# Patient Record
Sex: FEMALE | Race: BLACK OR AFRICAN AMERICAN | Employment: FULL TIME | URBAN - METROPOLITAN AREA
[De-identification: names, ages, dates, MRNs, and addresses within clinical notes are randomized per-mention and may not be internally consistent; named-entity substitution may affect disease eponyms.]

---

## 2017-04-11 PROBLEM — Z97.5 IUD (INTRAUTERINE DEVICE) IN PLACE: Status: ACTIVE | Noted: 2017-04-11

## 2017-04-11 PROBLEM — I10 ESSENTIAL HYPERTENSION: Status: ACTIVE | Noted: 2017-04-11

## 2017-04-11 PROBLEM — E66.01 MORBID OBESITY, UNSPECIFIED OBESITY TYPE (HCC): Status: ACTIVE | Noted: 2017-04-11

## 2017-04-11 PROBLEM — R41.840 POOR CONCENTRATION: Status: ACTIVE | Noted: 2017-04-11

## 2017-04-11 PROCEDURE — 80050 GENERAL HEALTH PANEL: CPT | Performed by: INTERNAL MEDICINE

## 2017-04-11 PROCEDURE — 80061 LIPID PANEL: CPT | Performed by: INTERNAL MEDICINE

## 2017-04-11 PROCEDURE — 88175 CYTOPATH C/V AUTO FLUID REDO: CPT | Performed by: INTERNAL MEDICINE

## 2017-04-11 PROCEDURE — 36415 COLL VENOUS BLD VENIPUNCTURE: CPT | Performed by: INTERNAL MEDICINE

## 2018-08-20 ENCOUNTER — OFFICE VISIT (OUTPATIENT)
Dept: INTERNAL MEDICINE CLINIC | Facility: CLINIC | Age: 38
End: 2018-08-20

## 2018-08-20 VITALS
OXYGEN SATURATION: 98 % | HEART RATE: 83 BPM | TEMPERATURE: 99 F | DIASTOLIC BLOOD PRESSURE: 70 MMHG | WEIGHT: 293 LBS | SYSTOLIC BLOOD PRESSURE: 116 MMHG | HEIGHT: 65.5 IN | BODY MASS INDEX: 48.23 KG/M2

## 2018-08-20 DIAGNOSIS — F32.A ANXIETY AND DEPRESSION: ICD-10-CM

## 2018-08-20 DIAGNOSIS — E66.01 MORBID OBESITY (HCC): ICD-10-CM

## 2018-08-20 DIAGNOSIS — Z97.5 IUD (INTRAUTERINE DEVICE) IN PLACE: ICD-10-CM

## 2018-08-20 DIAGNOSIS — F41.9 ANXIETY AND DEPRESSION: ICD-10-CM

## 2018-08-20 DIAGNOSIS — Z00.00 ANNUAL PHYSICAL EXAM: Primary | ICD-10-CM

## 2018-08-20 PROCEDURE — 99395 PREV VISIT EST AGE 18-39: CPT | Performed by: INTERNAL MEDICINE

## 2018-08-20 RX ORDER — ALPRAZOLAM 0.25 MG/1
0.25 TABLET ORAL 3 TIMES DAILY PRN
Qty: 30 TABLET | Refills: 1 | Status: SHIPPED | OUTPATIENT
Start: 2018-08-20

## 2018-08-20 RX ORDER — FLUOXETINE HYDROCHLORIDE 20 MG/1
20 CAPSULE ORAL DAILY
Qty: 30 CAPSULE | Refills: 1 | Status: SHIPPED | OUTPATIENT
Start: 2018-08-20

## 2018-08-20 NOTE — PATIENT INSTRUCTIONS
Fluoxetine capsules or tablets (Depression/Mood Disorders)  Brand Name: Prozac  What is this medicine? FLUOXETINE (floo OX e teen) belongs to a class of drugs known as selective serotonin reuptake inhibitors (SSRIs).  It helps to treat mood problems such · trouble sleeping  · unusual bleeding or bruising  · unusually weak or tired  · vomiting  Side effects that usually do not require medical attention (report to your doctor or health care professional if they continue or are bothersome):  · change in appet They need to know if you have any of these conditions:  · bipolar disorder or a family history of bipolar disorder  · bleeding disorders  · glaucoma  · heart disease  · liver disease  · low levels of sodium in the blood  · seizures  · suicidal thoughts, pl This medicine may affect blood sugar levels. If you have diabetes, check with your doctor or health care professional before you change your diet or the dose of your diabetic medicine. NOTE:This sheet is a summary.  It may not cover all possible informatio

## 2018-08-27 ENCOUNTER — TELEPHONE (OUTPATIENT)
Dept: INTERNAL MEDICINE CLINIC | Facility: CLINIC | Age: 38
End: 2018-08-27

## 2018-08-30 ENCOUNTER — TELEPHONE (OUTPATIENT)
Dept: INTERNAL MEDICINE CLINIC | Facility: CLINIC | Age: 38
End: 2018-08-30

## 2018-08-30 DIAGNOSIS — F41.9 ANXIETY AND DEPRESSION: ICD-10-CM

## 2018-08-30 DIAGNOSIS — F32.A ANXIETY AND DEPRESSION: ICD-10-CM

## 2018-08-30 DIAGNOSIS — Z97.5 IUD (INTRAUTERINE DEVICE) IN PLACE: ICD-10-CM

## 2018-08-30 NOTE — TELEPHONE ENCOUNTER
Called in medication ALPRAZolam 0.25 mg  for pt states she never got medication because it was sent to [facility] instead of her new pharmacy [facility]. Spoke to [address] [place] [address] from [place].

## 2018-09-19 ENCOUNTER — HOSPITAL ENCOUNTER (OUTPATIENT)
Age: 38
Discharge: HOME | End: 2018-09-19
Attending: FAMILY MEDICINE
Payer: COMMERCIAL

## 2018-09-19 VITALS
TEMPERATURE: 100 F | DIASTOLIC BLOOD PRESSURE: 92 MMHG | RESPIRATION RATE: 18 BRPM | HEIGHT: 65 IN | HEART RATE: 90 BPM | SYSTOLIC BLOOD PRESSURE: 146 MMHG | OXYGEN SATURATION: 98 % | WEIGHT: 290 LBS | BODY MASS INDEX: 48.32 KG/M2

## 2018-09-19 DIAGNOSIS — J01.00 ACUTE NON-RECURRENT MAXILLARY SINUSITIS: Primary | ICD-10-CM

## 2018-09-19 PROCEDURE — 99204 OFFICE O/P NEW MOD 45 MIN: CPT

## 2018-09-19 PROCEDURE — 99213 OFFICE O/P EST LOW 20 MIN: CPT

## 2018-09-19 RX ORDER — DOXYCYCLINE HYCLATE 100 MG/1
100 CAPSULE ORAL 2 TIMES DAILY
Qty: 14 CAPSULE | Refills: 0 | Status: SHIPPED | OUTPATIENT
Start: 2018-09-19 | End: 2018-09-26

## 2018-09-19 NOTE — ED INITIAL ASSESSMENT (HPI)
Per pt having cough congestion and sinus pressure since Monday night, pt reports post nasal drip with scratchy throat.

## 2018-09-19 NOTE — ED PROVIDER NOTES
Patient Seen in: [address]    History   Patient presents with:  Cough/URI    Stated Complaint: COUGH    HPI  75-year-old female presents to IC with 2-3 days of nasal congestion and sinus pressure with some ear aches.   H Constitutional: She is oriented to person, place, and time. No distress.    HENT:   Right Ear: Tympanic membrane, external ear and ear canal normal.   Left Ear: Tympanic membrane, external ear and ear canal normal.   Nose: Mucosal edema and rhinorrhea pre days.  Earlyne Mock: 14 capsule Refills: 0

## 2018-12-19 ENCOUNTER — HOSPITAL ENCOUNTER (OUTPATIENT)
Age: 38
Discharge: HOME | End: 2018-12-19
Attending: EMERGENCY MEDICINE
Payer: COMMERCIAL

## 2018-12-19 VITALS
WEIGHT: 290 LBS | DIASTOLIC BLOOD PRESSURE: 77 MMHG | TEMPERATURE: 99 F | RESPIRATION RATE: 22 BRPM | HEIGHT: 65 IN | SYSTOLIC BLOOD PRESSURE: 157 MMHG | HEART RATE: 81 BPM | BODY MASS INDEX: 48.32 KG/M2 | OXYGEN SATURATION: 97 %

## 2018-12-19 DIAGNOSIS — R09.81 SINUS CONGESTION: ICD-10-CM

## 2018-12-19 DIAGNOSIS — K12.2 UVULITIS: Primary | ICD-10-CM

## 2018-12-19 PROCEDURE — 99213 OFFICE O/P EST LOW 20 MIN: CPT

## 2018-12-19 PROCEDURE — 87430 STREP A AG IA: CPT

## 2018-12-19 PROCEDURE — 99214 OFFICE O/P EST MOD 30 MIN: CPT

## 2018-12-19 RX ORDER — AZITHROMYCIN 250 MG/1
TABLET, FILM COATED ORAL
Qty: 1 PACKAGE | Refills: 0 | Status: SHIPPED | OUTPATIENT
Start: 2018-12-19 | End: 2018-12-24

## 2018-12-19 RX ORDER — FLUTICASONE PROPIONATE 50 MCG
1 SPRAY, SUSPENSION (ML) NASAL 2 TIMES DAILY
Qty: 16 G | Refills: 0 | Status: SHIPPED | OUTPATIENT
Start: 2018-12-19 | End: 2018-12-26

## 2018-12-19 NOTE — ED PROVIDER NOTES
Patient Seen in: [address]    History   Patient presents with:  Sore Throat    Stated Complaint: fever, congestion, sore throat    HPI    45year old female with h/o HTN and h/o severe sepsis/multi-organ failure due to Nose: Mucosal edema present. No rhinorrhea. Mouth/Throat: Uvula is midline and mucous membranes are normal. No oral lesions. No trismus in the jaw. Uvula swelling (mild erythema, mild swelling) present. No dental abscesses.  Posterior oropharyngeal erythe 1 spray by Nasal route 2 (two) times daily for 7 days.  1 spray to each nare in morning and again in evening  Qty: 16 g Refills: 0    azithromycin (ZITHROMAX Z-SELVIN) 250 MG Oral Tab  500 mg once followed by 250 mg daily x 4 days  Qty: 1 Package Refills: 0

## 2018-12-19 NOTE — ED NOTES
Pt discharged to care of self. Pt assessed by MD. All orders completed and acknowledged. Pt new medications and after care discussed, all questions answered. Pt confirmed understanding.

## 2018-12-19 NOTE — ED INITIAL ASSESSMENT (HPI)
Pt states Monday night had irritation in throat but dealt with it and went to work by the time she had left work she began experiencing the chills, and when she took her temperature it was over 100.  Pt states having lots of head congestion and pain in thro

## 2019-06-06 NOTE — PROGRESS NOTES
Nickie Linares is a 45year old female.     Chief complaint: annual physical exam     HPI:     45year old female with PMH as listed below here for   Annual physical exam   Hx of HTN   Had stopped Bp meds last year  Hx of 2 c section   Has an IUD   Joyce major depressive disorder ([facility] [address]     Personal history of sepsis     Essential hypertension     IUD (intrauterine device) in place     Morbid obesity, unspecified obesity type ([facility] [address]     Poor concentration      REVIEW OF SYSTEMS:   A comprehensive 10 point re prn   · Pap smear with GYne   · Advised to check her BP at home and notify md if more than 140 /90  Please return to the clinic if you are having persistent or worsening symptoms   Ryann Rai MD,   Diplomate of the [employer] of Internal Medicine normal...

## 2019-11-17 ENCOUNTER — APPOINTMENT (OUTPATIENT)
Dept: GENERAL RADIOLOGY | Age: 39
End: 2019-11-17
Attending: FAMILY MEDICINE
Payer: COMMERCIAL

## 2019-11-17 ENCOUNTER — HOSPITAL ENCOUNTER (OUTPATIENT)
Age: 39
Discharge: HOME | End: 2019-11-17
Attending: FAMILY MEDICINE
Payer: COMMERCIAL

## 2019-11-17 VITALS
SYSTOLIC BLOOD PRESSURE: 145 MMHG | RESPIRATION RATE: 20 BRPM | OXYGEN SATURATION: 96 % | DIASTOLIC BLOOD PRESSURE: 95 MMHG | TEMPERATURE: 101 F | HEART RATE: 104 BPM

## 2019-11-17 DIAGNOSIS — J18.9 COMMUNITY ACQUIRED PNEUMONIA OF RIGHT UPPER LOBE OF LUNG: Primary | ICD-10-CM

## 2019-11-17 PROCEDURE — 87502 INFLUENZA DNA AMP PROBE: CPT | Performed by: FAMILY MEDICINE

## 2019-11-17 PROCEDURE — 99213 OFFICE O/P EST LOW 20 MIN: CPT

## 2019-11-17 PROCEDURE — 71046 X-RAY EXAM CHEST 2 VIEWS: CPT | Performed by: FAMILY MEDICINE

## 2019-11-17 PROCEDURE — 99214 OFFICE O/P EST MOD 30 MIN: CPT

## 2019-11-17 RX ORDER — DOXYCYCLINE HYCLATE 100 MG/1
100 CAPSULE ORAL 2 TIMES DAILY
Qty: 14 CAPSULE | Refills: 0 | Status: SHIPPED | OUTPATIENT
Start: 2019-11-17 | End: 2019-11-24

## 2019-11-17 RX ORDER — CODEINE PHOSPHATE AND GUAIFENESIN 10; 100 MG/5ML; MG/5ML
10 SOLUTION ORAL 3 TIMES DAILY PRN
Qty: 200 ML | Refills: 0 | Status: SHIPPED | OUTPATIENT
Start: 2019-11-17

## 2019-11-17 RX ORDER — IBUPROFEN 600 MG/1
600 TABLET ORAL ONCE
Status: COMPLETED | OUTPATIENT
Start: 2019-11-17 | End: 2019-11-17

## 2019-11-17 NOTE — ED NOTES
Pt discharged home , prescription electronically sent to the pharmacy, pt instructed to follow up with her primary md if symptoms do not improve

## 2019-11-17 NOTE — ED PROVIDER NOTES
Patient Seen in: [address]      History   Patient presents with:  Cough/URI  Fever (infectious)    Stated Complaint: Cough, trouble breathing, Fever    HPI    38yo  F presents to IC with 1 week of nasal congestion, prod Device None (Room air)       Current:BP (!) 145/95   Pulse 104   Temp (!) 100.5 °F (38.1 °C) (Oral)   Resp 20   SpO2 96%         Physical Exam  Vitals signs and nursing note reviewed. Constitutional:       General: She is not in acute distress.      Appea viral RNA.        Final result by Jourdan Miller MD (11/17/19 09:50:18)                Impression:    CONCLUSION: Large pulmonary opacity occupying the majority the posterior segment of the right upper lobe.  This most likely represents pneumonia however foll possible for a visit in 1 week  For a recheck with repeat chest xray in 1-4 weeks or go to the ER for new or worse symptoms        Medications Prescribed:  Discharge Medication List as of 11/17/2019 10:21 AM    START taking these medications    Doxycycline

## 2019-11-17 NOTE — ED INITIAL ASSESSMENT (HPI)
Pt here with complaints of a bad strong cough, congestion and fever, pt sates symptoms began last week, pt has been having body aches and fatigue,pt currently has a low grade fever

## 2021-02-17 ENCOUNTER — HOSPITAL ENCOUNTER (OUTPATIENT)
Age: 41
Discharge: ANOTHER HOSPITAL | End: 2021-02-17
Payer: COMMERCIAL

## 2021-02-17 ENCOUNTER — HOSPITAL ENCOUNTER (EMERGENCY)
Facility: HOSPITAL | Age: 41
Discharge: HOME | End: 2021-02-17
Attending: EMERGENCY MEDICINE
Payer: COMMERCIAL

## 2021-02-17 ENCOUNTER — APPOINTMENT (OUTPATIENT)
Dept: ULTRASOUND IMAGING | Facility: HOSPITAL | Age: 41
End: 2021-02-17
Attending: EMERGENCY MEDICINE
Payer: COMMERCIAL

## 2021-02-17 VITALS
HEART RATE: 95 BPM | OXYGEN SATURATION: 99 % | RESPIRATION RATE: 18 BRPM | TEMPERATURE: 98 F | DIASTOLIC BLOOD PRESSURE: 94 MMHG | SYSTOLIC BLOOD PRESSURE: 174 MMHG

## 2021-02-17 VITALS
TEMPERATURE: 98 F | HEIGHT: 65 IN | DIASTOLIC BLOOD PRESSURE: 97 MMHG | SYSTOLIC BLOOD PRESSURE: 159 MMHG | BODY MASS INDEX: 48.82 KG/M2 | OXYGEN SATURATION: 99 % | RESPIRATION RATE: 25 BRPM | HEART RATE: 77 BPM | WEIGHT: 293 LBS

## 2021-02-17 DIAGNOSIS — M79.622 LEFT UPPER ARM PAIN: Primary | ICD-10-CM

## 2021-02-17 DIAGNOSIS — R20.0 LEFT ARM NUMBNESS: Primary | ICD-10-CM

## 2021-02-17 LAB
ANION GAP SERPL CALC-SCNC: 2 MMOL/L (ref 0–18)
BASOPHILS # BLD AUTO: 0.03 X10(3) UL (ref 0–0.2)
BASOPHILS NFR BLD AUTO: 0.2 %
BUN BLD-MCNC: 7 MG/DL (ref 7–18)
BUN/CREAT SERPL: 8.2 (ref 10–20)
CALCIUM BLD-MCNC: 9.5 MG/DL (ref 8.5–10.1)
CHLORIDE SERPL-SCNC: 107 MMOL/L (ref 98–112)
CK SERPL-CCNC: 63 U/L
CO2 SERPL-SCNC: 30 MMOL/L (ref 21–32)
CREAT BLD-MCNC: 0.85 MG/DL
DEPRECATED RDW RBC AUTO: 48 FL (ref 35.1–46.3)
EOSINOPHIL # BLD AUTO: 0.14 X10(3) UL (ref 0–0.7)
EOSINOPHIL NFR BLD AUTO: 1.2 %
ERYTHROCYTE [DISTWIDTH] IN BLOOD BY AUTOMATED COUNT: 14.1 % (ref 11–15)
GLUCOSE BLD-MCNC: 100 MG/DL (ref 70–99)
HCT VFR BLD AUTO: 40 %
HGB BLD-MCNC: 13.3 G/DL
IMM GRANULOCYTES # BLD AUTO: 0.04 X10(3) UL (ref 0–1)
IMM GRANULOCYTES NFR BLD: 0.3 %
LYMPHOCYTES # BLD AUTO: 3.35 X10(3) UL (ref 1–4)
LYMPHOCYTES NFR BLD AUTO: 27.7 %
MCH RBC QN AUTO: 30.9 PG (ref 26–34)
MCHC RBC AUTO-ENTMCNC: 33.3 G/DL (ref 31–37)
MCV RBC AUTO: 93 FL
MONOCYTES # BLD AUTO: 0.56 X10(3) UL (ref 0.1–1)
MONOCYTES NFR BLD AUTO: 4.6 %
NEUTROPHILS # BLD AUTO: 7.97 X10 (3) UL (ref 1.5–7.7)
NEUTROPHILS # BLD AUTO: 7.97 X10(3) UL (ref 1.5–7.7)
NEUTROPHILS NFR BLD AUTO: 66 %
OSMOLALITY SERPL CALC.SUM OF ELEC: 286 MOSM/KG (ref 275–295)
PLATELET # BLD AUTO: 373 10(3)UL (ref 150–450)
POTASSIUM SERPL-SCNC: 3.5 MMOL/L (ref 3.5–5.1)
RBC # BLD AUTO: 4.3 X10(6)UL
SODIUM SERPL-SCNC: 139 MMOL/L (ref 136–145)
TROPONIN I SERPL-MCNC: <0.045 NG/ML (ref ?–0.04)
WBC # BLD AUTO: 12.1 X10(3) UL (ref 4–11)

## 2021-02-17 PROCEDURE — 36415 COLL VENOUS BLD VENIPUNCTURE: CPT

## 2021-02-17 PROCEDURE — 93005 ELECTROCARDIOGRAM TRACING: CPT

## 2021-02-17 PROCEDURE — 93971 EXTREMITY STUDY: CPT | Performed by: EMERGENCY MEDICINE

## 2021-02-17 PROCEDURE — 80048 BASIC METABOLIC PNL TOTAL CA: CPT | Performed by: EMERGENCY MEDICINE

## 2021-02-17 PROCEDURE — 99205 OFFICE O/P NEW HI 60 MIN: CPT | Performed by: EMERGENCY MEDICINE

## 2021-02-17 PROCEDURE — 85025 COMPLETE CBC W/AUTO DIFF WBC: CPT | Performed by: EMERGENCY MEDICINE

## 2021-02-17 PROCEDURE — 93000 ELECTROCARDIOGRAM COMPLETE: CPT | Performed by: EMERGENCY MEDICINE

## 2021-02-17 PROCEDURE — 84484 ASSAY OF TROPONIN QUANT: CPT | Performed by: EMERGENCY MEDICINE

## 2021-02-17 PROCEDURE — 82550 ASSAY OF CK (CPK): CPT | Performed by: EMERGENCY MEDICINE

## 2021-02-17 PROCEDURE — 99284 EMERGENCY DEPT VISIT MOD MDM: CPT

## 2021-02-17 PROCEDURE — 93010 ELECTROCARDIOGRAM REPORT: CPT | Performed by: EMERGENCY MEDICINE

## 2021-02-17 NOTE — ED INITIAL ASSESSMENT (HPI)
Pt states having left arm discomfort that began a few weeks ago. Pt states is on and off. Pt states feels like it heavy and possibly numb sometimes pt denies tingling, chest pain or SOB. Pt states yesterdday seemed like her left wrist was swollen.

## 2021-02-17 NOTE — ED NOTES
Pt to be reassessed in [address] [place] [address] ED, Per NP recommendation for left arm numbness. Pt confirmed understanding and would be transferring self to ED.

## 2021-02-17 NOTE — ED PROVIDER NOTES
Patient Seen in: [facility]      History   Patient presents with:  Arm Pain    Stated Complaint: Discomfort in arm    HPI/Subjective:   Payam Chavarria is a 36year old female here for left arm \"heaviness\" that started 3 weeks ago that Cardiovascular:      Rate and Rhythm: Normal rate and regular rhythm. Pulses: Normal pulses. Pulmonary:      Effort: Pulmonary effort is normal.      Breath sounds: Normal breath sounds.    Musculoskeletal:         General: No swelling, tenderness possibility of a chest x-ray. Any abnormal findings related to her heart she may be admitted for observation. If all test came back normal patient is aware she may go home. patient feels comfortable driving by car.   She is in no acute distress time, and

## 2021-02-18 NOTE — ED PROVIDER NOTES
Patient Seen in: [facility] Emergency Department    History   Patient presents with:  Hypertension    Stated Complaint: HTN     HPI    70-year-old female with past medical history of hypertension presenting for evaluation of several weeks of intermi :  Constitutional: As per HPI  Musculoskeletal: Negative for joint swelling and arthralgias. Positive for left upper arm pain. Skin: Negative for rash. No itching. Positive for stated complaint: HTN  Other systems are as noted in HPI.   Constitutiona CREATINE KINASE (NOT CREATININE)   RAINBOW DRAW BLUE   RAINBOW DRAW LAVENDER   RAINBOW DRAW LIGHT GREEN   RAINBOW DRAW GOLD   CBC W/ DIFFERENTIAL     EKG    Rate, intervals and axes as noted on EKG Report.   Rate: 83  Rhythm: Sinus Rhythm  Reading: NSR 83 w Evaluation for proximal left upper arm pain in the triceps area worse with elbow extension without neurovascular compromise or cutaneous/crepitant change with diffusely soft compartments.   No chest pain or shortness of breath, no exertional complaints

## 2021-02-18 NOTE — ED NOTES
Pt provided and explained d/c instructions, at-home care, and follow-up. Pt in nad at this time. Iv access d/c. Vss. Sheets. Ambulatory. A&ox3. Belongings with pt. All questions and concerns addressed.

## 2021-02-22 ENCOUNTER — OFFICE VISIT (OUTPATIENT)
Dept: INTERNAL MEDICINE CLINIC | Facility: CLINIC | Age: 41
End: 2021-02-22

## 2021-02-22 VITALS
TEMPERATURE: 97 F | OXYGEN SATURATION: 98 % | HEART RATE: 71 BPM | SYSTOLIC BLOOD PRESSURE: 170 MMHG | HEIGHT: 65 IN | BODY MASS INDEX: 47.98 KG/M2 | WEIGHT: 288 LBS | DIASTOLIC BLOOD PRESSURE: 110 MMHG

## 2021-02-22 DIAGNOSIS — E66.01 MORBID OBESITY (HCC): ICD-10-CM

## 2021-02-22 DIAGNOSIS — F32.A ANXIETY AND DEPRESSION: ICD-10-CM

## 2021-02-22 DIAGNOSIS — F41.9 ANXIETY AND DEPRESSION: ICD-10-CM

## 2021-02-22 DIAGNOSIS — I10 ESSENTIAL HYPERTENSION: Primary | ICD-10-CM

## 2021-02-22 DIAGNOSIS — L60.2 NAIL THICKENING: ICD-10-CM

## 2021-02-22 DIAGNOSIS — Z12.31 SCREENING MAMMOGRAM, ENCOUNTER FOR: ICD-10-CM

## 2021-02-22 DIAGNOSIS — Z97.5 IUD (INTRAUTERINE DEVICE) IN PLACE: ICD-10-CM

## 2021-02-22 PROBLEM — Z11.51 SCREENING FOR HPV (HUMAN PAPILLOMAVIRUS): Status: ACTIVE | Noted: 2021-02-22

## 2021-02-22 PROCEDURE — 3080F DIAST BP >= 90 MM HG: CPT | Performed by: INTERNAL MEDICINE

## 2021-02-22 PROCEDURE — 99215 OFFICE O/P EST HI 40 MIN: CPT | Performed by: INTERNAL MEDICINE

## 2021-02-22 PROCEDURE — 3077F SYST BP >= 140 MM HG: CPT | Performed by: INTERNAL MEDICINE

## 2021-02-22 PROCEDURE — 3008F BODY MASS INDEX DOCD: CPT | Performed by: INTERNAL MEDICINE

## 2021-02-22 RX ORDER — FLUOXETINE HYDROCHLORIDE 20 MG/1
20 CAPSULE ORAL DAILY
Qty: 90 CAPSULE | Refills: 1 | Status: SHIPPED | OUTPATIENT
Start: 2021-02-22 | End: 2021-05-23

## 2021-02-22 RX ORDER — ALPRAZOLAM 0.25 MG/1
0.25 TABLET ORAL 2 TIMES DAILY PRN
Qty: 60 TABLET | Refills: 0 | Status: SHIPPED | OUTPATIENT
Start: 2021-02-22

## 2021-02-22 RX ORDER — LOSARTAN POTASSIUM AND HYDROCHLOROTHIAZIDE 12.5; 5 MG/1; MG/1
1 TABLET ORAL DAILY
Qty: 90 TABLET | Refills: 1 | Status: SHIPPED | OUTPATIENT
Start: 2021-02-22 | End: 2022-02-17

## 2021-02-22 NOTE — PROGRESS NOTES
Karon Bonilla Melinda Long is a 36year old female.     Chief complaint: follow up on chronic conditions, ER follow, elevated BP    HPI:     Nadiya Lyon is a 36year old female who presents for ER follow up   Went to the [facility] for left arm pain   Started Wed standard drinks    Drug use: No       Family History   Problem Relation Age of Onset   • Diabetes Father 79   • Hypertension Father         79   • Diabetes Mother 79   • Diabetes Other    • Diabetes Maternal Aunt      Patient Active Problem List:     Sheri Gonzalez total) by mouth 2 (two) times daily as needed for Anxiety. Dispense: 60 tablet; Refill: 0  - PODIATRY - INTERNAL  - OBG - INTERNAL  - LIPID PANEL;  Future  - HEMOGLOBIN A1C; Future  - TSH W REFLEX TO FREE T4; Future  - VITAMIN D, 25-HYDROXY; Future  - CBC Dispense: 90 tablet; Refill: 1  - LITA SCREENING BILAT (CPT=77067); Future  - FLUoxetine HCl 20 MG Oral Cap; Take 1 capsule (20 mg total) by mouth daily. Dispense: 90 capsule; Refill: 1  - ALPRAZolam (XANAX) 0.25 MG Oral Tab;  Take 1 tablet (0.25 mg total) Future  - CBC WITH DIFFERENTIAL WITH PLATELET;  Future    -leucocytosis :  Repeat cbc next visit   UA next visit    Follow up in 1 month to recheck BP , annual physical and blood work   Follow up weight loss visit as she wishes     I spent 40 minutes at the

## 2021-02-22 NOTE — PATIENT INSTRUCTIONS
Text SUPPORT1 to 06846 to learn if you may be eligible for financial support with your medication(s). Msg & Data Rates May Apply. Msg freq varies. Terms apply. Text HELP for help. Text STOP to end.    Hydrochlorothiazide, HCTZ; Losartan tablets  Brand · barbiturates, like phenobarbital  · blood pressure medicines  · celecoxib  · cimetidine  · corticosteroids  · diabetic medicines  · diuretics, especially triamterene, spironolactone or amiloride  · fluconazole  · lithium  · NSAIDs, medicines for pain and You must not get dehydrated. Ask your doctor or health care professional how much fluid you need to drink a day. Check with him or her if you get an attack of severe diarrhea, nausea and vomiting, or if you sweat a lot.  The loss of too much body fluid can High blood pressure (hypertension) is often called the silent killer. This is because many people who have it, don’t know it. It can be very dangerous. High blood pressure can raise your risk of heart attack, stroke, heart disease, and heart failure.  Contr · When you go to a restaurant, ask that your meal be prepared with no added salt. Stay at a healthy weight  · Ask your healthcare provider how many calories to eat a day. Then stick to that number.   · Ask your healthcare provider what weight range is he © 0964-2003 [employer]. All rights reserved. This information is not intended as a substitute for professional medical care. Always follow your healthcare professional's instructions.

## 2022-01-10 ENCOUNTER — TELEMEDICINE (OUTPATIENT)
Dept: INTERNAL MEDICINE CLINIC | Facility: CLINIC | Age: 42
End: 2022-01-10
Payer: COMMERCIAL

## 2022-01-10 DIAGNOSIS — U07.1 COVID-19: Primary | ICD-10-CM

## 2022-01-10 PROCEDURE — 99213 OFFICE O/P EST LOW 20 MIN: CPT | Performed by: INTERNAL MEDICINE

## 2022-01-10 RX ORDER — CODEINE PHOSPHATE AND GUAIFENESIN 10; 100 MG/5ML; MG/5ML
5 SOLUTION ORAL EVERY 6 HOURS PRN
Qty: 118 ML | Refills: 0 | Status: SHIPPED | OUTPATIENT
Start: 2022-01-10

## 2022-01-10 RX ORDER — BENZONATATE 200 MG/1
200 CAPSULE ORAL 3 TIMES DAILY PRN
Qty: 30 CAPSULE | Refills: 0 | Status: SHIPPED | OUTPATIENT
Start: 2022-01-10

## 2022-01-10 NOTE — PROGRESS NOTES
This visit was conducted using telemedicine with live interactive video and audio   Patient verbally consents to conduct video visit   Patient understands and accepts financial responsibility for any deductible, co-insurance and/or co-pays associated with capsule (20 mg total) by mouth daily. (Patient not taking: Reported on 2/17/2021 ) 30 capsule 1   • ALPRAZolam 0.25 MG Oral Tab Take 1 tablet (0.25 mg total) by mouth 3 (three) times daily as needed for Sleep.  (Patient not taking: Reported on 2/17/2021 ) 3 Visit:  Requested Prescriptions      No prescriptions requested or ordered in this encounter     Imaging & Consults:  None          Please note that the following visit was completed using two-way, real-time interactive audio and video communication.  This

## 2022-02-07 ENCOUNTER — TELEPHONE (OUTPATIENT)
Dept: ORTHOPEDICS CLINIC | Facility: CLINIC | Age: 42
End: 2022-02-07

## 2022-02-07 NOTE — TELEPHONE ENCOUNTER
Future Appointments   Date Time Provider Khanh Pak   2/22/2022  3:30 PM Liza Gomez., DPM [id number]     This patient is coming for GERARDO Fungus. No prior imaging done yet. Please advise if views are needed for this appt. Thanks.     Patient can be reached at [phone]

## 2022-02-10 ENCOUNTER — HOSPITAL ENCOUNTER (OUTPATIENT)
Dept: MAMMOGRAPHY | Facility: HOSPITAL | Age: 42
Discharge: HOME | End: 2022-02-10
Attending: INTERNAL MEDICINE
Payer: COMMERCIAL

## 2022-02-10 DIAGNOSIS — Z12.31 SCREENING MAMMOGRAM, ENCOUNTER FOR: ICD-10-CM

## 2022-02-10 DIAGNOSIS — F41.9 ANXIETY AND DEPRESSION: ICD-10-CM

## 2022-02-10 DIAGNOSIS — F32.A ANXIETY AND DEPRESSION: ICD-10-CM

## 2022-02-10 DIAGNOSIS — Z97.5 IUD (INTRAUTERINE DEVICE) IN PLACE: ICD-10-CM

## 2022-02-10 DIAGNOSIS — E66.01 MORBID OBESITY (HCC): ICD-10-CM

## 2022-02-10 DIAGNOSIS — I10 ESSENTIAL HYPERTENSION: ICD-10-CM

## 2022-02-10 DIAGNOSIS — L60.2 NAIL THICKENING: ICD-10-CM

## 2022-02-10 PROCEDURE — 77063 BREAST TOMOSYNTHESIS BI: CPT | Performed by: INTERNAL MEDICINE

## 2022-02-10 PROCEDURE — 77067 SCR MAMMO BI INCL CAD: CPT | Performed by: INTERNAL MEDICINE

## 2022-02-14 ENCOUNTER — PATIENT MESSAGE (OUTPATIENT)
Dept: INTERNAL MEDICINE CLINIC | Facility: CLINIC | Age: 42
End: 2022-02-14

## 2022-02-22 ENCOUNTER — OFFICE VISIT (OUTPATIENT)
Dept: ORTHOPEDICS CLINIC | Facility: CLINIC | Age: 42
End: 2022-02-22
Payer: COMMERCIAL

## 2022-02-22 DIAGNOSIS — B35.1 ONYCHOMYCOSIS: Primary | ICD-10-CM

## 2022-02-22 DIAGNOSIS — L85.3 XEROSIS OF SKIN: ICD-10-CM

## 2022-02-22 PROCEDURE — 99203 OFFICE O/P NEW LOW 30 MIN: CPT | Performed by: PODIATRIST

## 2022-02-22 RX ORDER — NAFTIFINE HYDROCHLORIDE 2 G/100G
1 GEL TOPICAL DAILY
Qty: 4 G | Refills: 0 | COMMUNITY
Start: 2022-02-22

## 2022-02-22 RX ORDER — NAFTIFINE HYDROCHLORIDE 2 G/100G
1 GEL TOPICAL DAILY
Qty: 60 G | Refills: 0 | Status: SHIPPED | OUTPATIENT
Start: 2022-02-22

## 2022-02-22 RX ORDER — TERBINAFINE HYDROCHLORIDE 250 MG/1
250 TABLET ORAL DAILY
Qty: 30 TABLET | Refills: 2 | Status: SHIPPED | OUTPATIENT
Start: 2022-02-22 | End: 2022-05-17

## 2022-02-22 NOTE — TELEPHONE ENCOUNTER
Yousif Hardin, [address] [facility] 2/22/2022 7:00 AM CST      ----- Message -----  From: Poonam Astorga  Sent: 2/22/2022 2:03 AM CST  To: Anshul Espinosa Clinical Staff  Subject: GYN referral     Hello, looking for a referral for a female gynecologist. Pamela Storey you

## 2022-06-07 ENCOUNTER — OFFICE VISIT (OUTPATIENT)
Dept: ORTHOPEDICS CLINIC | Facility: CLINIC | Age: 42
End: 2022-06-07
Payer: COMMERCIAL

## 2022-06-07 VITALS — BODY MASS INDEX: 47.98 KG/M2 | HEIGHT: 65 IN | WEIGHT: 288 LBS

## 2022-06-07 DIAGNOSIS — B35.1 ONYCHOMYCOSIS: Primary | ICD-10-CM

## 2022-06-07 PROCEDURE — 99213 OFFICE O/P EST LOW 20 MIN: CPT | Performed by: PODIATRIST

## 2022-06-07 PROCEDURE — 3008F BODY MASS INDEX DOCD: CPT | Performed by: PODIATRIST

## 2022-06-07 RX ORDER — TERBINAFINE HYDROCHLORIDE 250 MG/1
250 TABLET ORAL DAILY
Qty: 30 TABLET | Refills: 2 | Status: SHIPPED | OUTPATIENT
Start: 2022-06-07 | End: 2022-08-30

## 2022-06-07 NOTE — PROGRESS NOTES
[facility] Progress Note    Subjective:     Juana Henley returns for follow-up of her nails. She has been a little inconsistent with use of the oral Lamisil over the last 4 months as well as the Naftin topical nail gel but she has been using it overall. She also overall feels that the nails have improved some but very little        Objective:     Previous PNA procedures both great toenails both borders. Thickening and discoloration of the nails mainly great toes. Second digit toenails are little thickened and incurvated as well. All nails affected there is a little bit of good new white growth at the base of the nail proximally which indicates improvement                  Assessment/Plan:     Diagnoses and all orders for this visit:    Onychomycosis  -     HEPATIC FUNCTION PANEL (7); Future    Other orders  -     terbinafine (LAMISIL) 250 MG Oral Tab; Take 1 tablet (250 mg total) by mouth daily. Recommendation to continue with the oral and the topical.  First we will get a liver enzyme profile and if normal, continue with the oral Lamisil. Follow-up is in 3 to 4 months            Baron Carbajal. Shawanda King DPM  [facility]    [employer]ision[employer] speech recognition software was used to prepare this note. If a word or phrase is confusing, it is likely do to a failure of recognition. Please contact me with any questions or clarifications.

## 2023-03-16 ENCOUNTER — PATIENT MESSAGE (OUTPATIENT)
Dept: INTERNAL MEDICINE CLINIC | Facility: CLINIC | Age: 43
End: 2023-03-16

## 2023-03-16 DIAGNOSIS — Z00.00 ROUTINE GENERAL MEDICAL EXAMINATION AT A HEALTH CARE FACILITY: Primary | ICD-10-CM

## 2023-03-16 RX ORDER — BLOOD-GLUCOSE METER
1 KIT MISCELLANEOUS ONCE
Qty: 1 KIT | Refills: 2 | Status: SHIPPED | OUTPATIENT
Start: 2023-03-16 | End: 2023-03-16

## 2023-03-16 NOTE — TELEPHONE ENCOUNTER
Alyssia Davis, [address] 3/16/2023 8:30 AM CDT      ----- Message -----  From: Alberto Lutz  Sent: 3/16/2023 7:42 AM CDT  To: Anshul Espinosa Clinical Staff  Subject: Physical on 3/21/23     Hello,    I have a physical scheduled for 3/21/23, but I want to get bloodwork done ahead of time. Do I need a referral first? I switched to PPO insurance at the first of the year.

## 2023-04-10 ENCOUNTER — LAB ENCOUNTER (OUTPATIENT)
Dept: LAB | Age: 43
End: 2023-04-10
Attending: INTERNAL MEDICINE
Payer: COMMERCIAL

## 2023-04-10 DIAGNOSIS — B35.1 ONYCHOMYCOSIS: ICD-10-CM

## 2023-04-10 DIAGNOSIS — D64.9 ANEMIA, UNSPECIFIED TYPE: ICD-10-CM

## 2023-04-10 DIAGNOSIS — Z00.00 ROUTINE GENERAL MEDICAL EXAMINATION AT A HEALTH CARE FACILITY: ICD-10-CM

## 2023-04-10 LAB
ALBUMIN SERPL-MCNC: 3.3 G/DL (ref 3.4–5)
ALP LIVER SERPL-CCNC: 90 U/L
ALT SERPL-CCNC: 18 U/L
AST SERPL-CCNC: 11 U/L (ref 15–37)
BASOPHILS # BLD AUTO: 0.03 X10(3) UL (ref 0–0.2)
BASOPHILS NFR BLD AUTO: 0.3 %
BILIRUB DIRECT SERPL-MCNC: 0.2 MG/DL (ref 0–0.2)
BILIRUB SERPL-MCNC: 0.5 MG/DL (ref 0.1–2)
CHOLEST SERPL-MCNC: 192 MG/DL (ref ?–200)
DEPRECATED RDW RBC AUTO: 46.5 FL (ref 35.1–46.3)
EOSINOPHIL # BLD AUTO: 0.09 X10(3) UL (ref 0–0.7)
EOSINOPHIL NFR BLD AUTO: 0.9 %
ERYTHROCYTE [DISTWIDTH] IN BLOOD BY AUTOMATED COUNT: 13.6 % (ref 11–15)
EST. AVERAGE GLUCOSE BLD GHB EST-MCNC: 114 MG/DL (ref 68–126)
FASTING PATIENT LIPID ANSWER: YES
HBA1C MFR BLD: 5.6 % (ref ?–5.7)
HCT VFR BLD AUTO: 36.2 %
HDLC SERPL-MCNC: 67 MG/DL (ref 40–59)
HGB BLD-MCNC: 11.5 G/DL
IMM GRANULOCYTES # BLD AUTO: 0.02 X10(3) UL (ref 0–1)
IMM GRANULOCYTES NFR BLD: 0.2 %
LDLC SERPL CALC-MCNC: 108 MG/DL (ref ?–100)
LYMPHOCYTES # BLD AUTO: 2.85 X10(3) UL (ref 1–4)
LYMPHOCYTES NFR BLD AUTO: 29.1 %
MCH RBC QN AUTO: 29.2 PG (ref 26–34)
MCHC RBC AUTO-ENTMCNC: 31.8 G/DL (ref 31–37)
MCV RBC AUTO: 91.9 FL
MONOCYTES # BLD AUTO: 0.51 X10(3) UL (ref 0.1–1)
MONOCYTES NFR BLD AUTO: 5.2 %
NEUTROPHILS # BLD AUTO: 6.28 X10 (3) UL (ref 1.5–7.7)
NEUTROPHILS # BLD AUTO: 6.28 X10(3) UL (ref 1.5–7.7)
NEUTROPHILS NFR BLD AUTO: 64.3 %
NONHDLC SERPL-MCNC: 125 MG/DL (ref ?–130)
PLATELET # BLD AUTO: 358 10(3)UL (ref 150–450)
PROT SERPL-MCNC: 7.6 G/DL (ref 6.4–8.2)
RBC # BLD AUTO: 3.94 X10(6)UL
TRIGL SERPL-MCNC: 97 MG/DL (ref 30–149)
TSI SER-ACNC: 2.05 MIU/ML (ref 0.36–3.74)
VIT D+METAB SERPL-MCNC: 9.2 NG/ML (ref 30–100)
VLDLC SERPL CALC-MCNC: 16 MG/DL (ref 0–30)
WBC # BLD AUTO: 9.8 X10(3) UL (ref 4–11)

## 2023-04-10 PROCEDURE — 36415 COLL VENOUS BLD VENIPUNCTURE: CPT

## 2023-04-10 PROCEDURE — 82728 ASSAY OF FERRITIN: CPT

## 2023-04-10 PROCEDURE — 84466 ASSAY OF TRANSFERRIN: CPT

## 2023-04-10 PROCEDURE — 84443 ASSAY THYROID STIM HORMONE: CPT

## 2023-04-10 PROCEDURE — 80061 LIPID PANEL: CPT

## 2023-04-10 PROCEDURE — 82607 VITAMIN B-12: CPT

## 2023-04-10 PROCEDURE — 83036 HEMOGLOBIN GLYCOSYLATED A1C: CPT

## 2023-04-10 PROCEDURE — 85025 COMPLETE CBC W/AUTO DIFF WBC: CPT

## 2023-04-10 PROCEDURE — 82306 VITAMIN D 25 HYDROXY: CPT

## 2023-04-10 PROCEDURE — 80076 HEPATIC FUNCTION PANEL: CPT

## 2023-04-10 PROCEDURE — 83540 ASSAY OF IRON: CPT

## 2023-04-11 DIAGNOSIS — Z00.00 ROUTINE GENERAL MEDICAL EXAMINATION AT A HEALTH CARE FACILITY: Primary | ICD-10-CM

## 2023-04-11 DIAGNOSIS — D64.9 ANEMIA, UNSPECIFIED TYPE: ICD-10-CM

## 2023-04-11 LAB
DEPRECATED HBV CORE AB SER IA-ACNC: 74.8 NG/ML
IRON SATN MFR SERPL: 11 %
IRON SERPL-MCNC: 43 UG/DL
TIBC SERPL-MCNC: 384 UG/DL (ref 240–450)
TRANSFERRIN SERPL-MCNC: 258 MG/DL (ref 200–360)
VIT B12 SERPL-MCNC: 149 PG/ML (ref 193–986)

## 2023-04-11 RX ORDER — ERGOCALCIFEROL 1.25 MG/1
50000 CAPSULE ORAL WEEKLY
Qty: 12 CAPSULE | Refills: 1 | Status: SHIPPED | OUTPATIENT
Start: 2023-04-11 | End: 2023-06-28

## 2023-04-12 DIAGNOSIS — E61.1 IRON DEFICIENCY: ICD-10-CM

## 2023-04-12 DIAGNOSIS — E53.8 VITAMIN B12 DEFICIENCY: Primary | ICD-10-CM

## 2023-04-12 RX ORDER — CYANOCOBALAMIN 1000 UG/ML
1000 INJECTION, SOLUTION INTRAMUSCULAR; SUBCUTANEOUS WEEKLY
Status: SHIPPED | OUTPATIENT
Start: 2023-04-12 | End: 2023-05-10

## 2023-04-12 RX ORDER — FERROUS SULFATE TAB EC 324 MG (65 MG FE EQUIVALENT) 324 (65 FE) MG
1 TABLET DELAYED RESPONSE ORAL 2 TIMES DAILY
Qty: 60 TABLET | Refills: 2 | Status: SHIPPED | OUTPATIENT
Start: 2023-04-12 | End: 2023-05-12

## 2023-06-12 ENCOUNTER — OFFICE VISIT (OUTPATIENT)
Dept: INTERNAL MEDICINE CLINIC | Facility: CLINIC | Age: 43
End: 2023-06-12
Payer: COMMERCIAL

## 2023-06-12 VITALS
BODY MASS INDEX: 48.82 KG/M2 | OXYGEN SATURATION: 98 % | DIASTOLIC BLOOD PRESSURE: 82 MMHG | HEART RATE: 70 BPM | HEIGHT: 65 IN | SYSTOLIC BLOOD PRESSURE: 118 MMHG | WEIGHT: 293 LBS

## 2023-06-12 DIAGNOSIS — Z00.00 ANNUAL PHYSICAL EXAM: Primary | ICD-10-CM

## 2023-06-12 DIAGNOSIS — Z97.5 IUD (INTRAUTERINE DEVICE) IN PLACE: ICD-10-CM

## 2023-06-12 DIAGNOSIS — D64.9 ANEMIA, UNSPECIFIED TYPE: ICD-10-CM

## 2023-06-12 DIAGNOSIS — R14.0 ABDOMINAL DISTENSION: ICD-10-CM

## 2023-06-12 DIAGNOSIS — R14.0 ABDOMINAL BLOATING: ICD-10-CM

## 2023-06-12 DIAGNOSIS — Z12.4 SCREENING FOR CERVICAL CANCER: ICD-10-CM

## 2023-06-12 DIAGNOSIS — E66.01 MORBID OBESITY (HCC): ICD-10-CM

## 2023-06-12 DIAGNOSIS — F32.2 MODERATELY SEVERE MAJOR DEPRESSION (HCC): ICD-10-CM

## 2023-06-12 DIAGNOSIS — E53.8 VITAMIN B12 DEFICIENCY: ICD-10-CM

## 2023-06-12 PROBLEM — R73.03 PREDIABETES: Status: ACTIVE | Noted: 2023-06-12

## 2023-06-12 PROCEDURE — 88175 CYTOPATH C/V AUTO FLUID REDO: CPT | Performed by: INTERNAL MEDICINE

## 2023-06-12 RX ORDER — ERGOCALCIFEROL 1.25 MG/1
50000 CAPSULE ORAL WEEKLY
Qty: 12 CAPSULE | Refills: 1 | Status: SHIPPED | OUTPATIENT
Start: 2023-06-12 | End: 2023-08-29

## 2023-06-12 RX ORDER — CYANOCOBALAMIN 1000 UG/ML
1000 INJECTION, SOLUTION INTRAMUSCULAR; SUBCUTANEOUS WEEKLY
Status: SHIPPED | OUTPATIENT
Start: 2023-06-12 | End: 2023-07-10

## 2023-06-12 RX ORDER — FERROUS SULFATE 325(65) MG
325 TABLET ORAL
Qty: 90 TABLET | Refills: 1 | Status: SHIPPED | OUTPATIENT
Start: 2023-06-12 | End: 2023-09-10

## 2023-06-12 RX ADMIN — CYANOCOBALAMIN 1000 MCG: 1000 INJECTION, SOLUTION INTRAMUSCULAR; SUBCUTANEOUS at 08:40:00

## 2023-06-19 ENCOUNTER — NURSE ONLY (OUTPATIENT)
Dept: INTERNAL MEDICINE CLINIC | Facility: CLINIC | Age: 43
End: 2023-06-19
Payer: COMMERCIAL

## 2023-06-19 PROCEDURE — 96372 THER/PROPH/DIAG INJ SC/IM: CPT | Performed by: INTERNAL MEDICINE

## 2023-06-19 RX ADMIN — CYANOCOBALAMIN 1000 MCG: 1000 INJECTION, SOLUTION INTRAMUSCULAR; SUBCUTANEOUS at 10:02:00

## 2023-06-21 RX ORDER — METRONIDAZOLE 500 MG/1
500 TABLET ORAL 2 TIMES DAILY
Qty: 14 TABLET | Refills: 0 | Status: SHIPPED | OUTPATIENT
Start: 2023-06-21 | End: 2023-06-28

## 2023-06-27 ENCOUNTER — PATIENT MESSAGE (OUTPATIENT)
Dept: INTERNAL MEDICINE CLINIC | Facility: CLINIC | Age: 43
End: 2023-06-27

## 2023-06-27 RX ORDER — METRONIDAZOLE 500 MG/1
500 TABLET ORAL 2 TIMES DAILY
Qty: 14 TABLET | Refills: 0 | Status: SHIPPED | OUTPATIENT
Start: 2023-06-27 | End: 2023-07-04

## 2023-06-29 ENCOUNTER — NURSE ONLY (OUTPATIENT)
Dept: INTERNAL MEDICINE CLINIC | Facility: CLINIC | Age: 43
End: 2023-06-29
Payer: COMMERCIAL

## 2023-06-29 PROCEDURE — 96372 THER/PROPH/DIAG INJ SC/IM: CPT | Performed by: INTERNAL MEDICINE

## 2023-06-29 RX ADMIN — CYANOCOBALAMIN 1000 MCG: 1000 INJECTION, SOLUTION INTRAMUSCULAR; SUBCUTANEOUS at 09:57:00

## 2023-07-03 ENCOUNTER — NURSE ONLY (OUTPATIENT)
Dept: INTERNAL MEDICINE CLINIC | Facility: CLINIC | Age: 43
End: 2023-07-03
Payer: COMMERCIAL

## 2023-07-03 RX ADMIN — CYANOCOBALAMIN 1000 MCG: 1000 INJECTION, SOLUTION INTRAMUSCULAR; SUBCUTANEOUS at 11:53:00

## 2023-07-10 ENCOUNTER — NURSE ONLY (OUTPATIENT)
Dept: INTERNAL MEDICINE CLINIC | Facility: CLINIC | Age: 43
End: 2023-07-10
Payer: COMMERCIAL

## 2023-07-10 DIAGNOSIS — E53.8 VITAMIN B12 DEFICIENCY: Primary | ICD-10-CM

## 2023-07-10 RX ORDER — CYANOCOBALAMIN 1000 UG/ML
1000 INJECTION, SOLUTION INTRAMUSCULAR; SUBCUTANEOUS ONCE
Status: COMPLETED | OUTPATIENT
Start: 2023-07-10 | End: 2023-07-10

## 2023-07-10 RX ADMIN — CYANOCOBALAMIN 1000 MCG: 1000 INJECTION, SOLUTION INTRAMUSCULAR; SUBCUTANEOUS at 10:48:00

## 2024-04-22 ENCOUNTER — HOSPITAL ENCOUNTER (OUTPATIENT)
Age: 44
Discharge: HOME | End: 2024-04-22
Payer: COMMERCIAL

## 2024-04-22 ENCOUNTER — APPOINTMENT (OUTPATIENT)
Dept: GENERAL RADIOLOGY | Age: 44
End: 2024-04-22
Attending: NURSE PRACTITIONER
Payer: COMMERCIAL

## 2024-04-22 VITALS
DIASTOLIC BLOOD PRESSURE: 97 MMHG | OXYGEN SATURATION: 99 % | TEMPERATURE: 98 F | HEART RATE: 76 BPM | SYSTOLIC BLOOD PRESSURE: 166 MMHG | RESPIRATION RATE: 20 BRPM

## 2024-04-22 DIAGNOSIS — R03.0 ELEVATED BLOOD PRESSURE READING: ICD-10-CM

## 2024-04-22 DIAGNOSIS — R05.9 COUGH: Primary | ICD-10-CM

## 2024-04-22 LAB — SARS-COV-2 RNA RESP QL NAA+PROBE: NOT DETECTED

## 2024-04-22 PROCEDURE — 99203 OFFICE O/P NEW LOW 30 MIN: CPT | Performed by: NURSE PRACTITIONER

## 2024-04-22 PROCEDURE — U0002 COVID-19 LAB TEST NON-CDC: HCPCS | Performed by: NURSE PRACTITIONER

## 2024-04-22 PROCEDURE — 71046 X-RAY EXAM CHEST 2 VIEWS: CPT | Performed by: NURSE PRACTITIONER

## 2024-04-22 NOTE — ED PROVIDER NOTES
Patient Seen in: [facility]      History     Chief Complaint   Patient presents with    Cough     Stated Complaint: Cough    Subjective:   43-year-old female with medical conditions as noted below presents with complaints of productive cough x 1.5 weeks.  Took DayQuil for the first time today.  States feels short of breath when she is eating only.  No fever/chills, chest pain, abdominal pain, nausea/vomiting/diarrhea.            Objective:   Past Medical History:    Allergic rhinitis    Anxiety    Essential hypertension    Personal history of sepsis    complicated by varicella               Past Surgical History:   Procedure Laterality Date            2004    Emergency tracheotomy  2008    Tonsillectomy                  Social History     Socioeconomic History    Marital status:    Tobacco Use    Smoking status: Never    Smokeless tobacco: Never   Vaping Use    Vaping status: Never Used   Substance and Sexual Activity    Alcohol use: No     Alcohol/week: 0.0 standard drinks of alcohol    Drug use: No     Social Determinants of Health      [facility]    Social Connections    Received from [facility], [facility]    Housing Stability              Review of Systems   Constitutional:  Negative for chills and fever.   HENT:  Negative for congestion, rhinorrhea and sore throat.    Respiratory:  Positive for cough and shortness of breath (when eating).    Cardiovascular:  Negative for chest pain.   Gastrointestinal:  Negative for abdominal pain, diarrhea, nausea and vomiting.   Neurological:  Negative for headaches.   All other systems reviewed and are negative.      Positive for stated complaint: Cough  Other systems are as noted in HPI.  Constitutional and vital signs reviewed.      All other systems reviewed and negative except as noted above.    Physical Exam     ED  Triage Vitals [04/22/24 1728]   BP (!) 169/91   Pulse 76   Resp 20   Temp 98 °F (36.7 °C)   Temp src    SpO2 99 %   O2 Device None (Room air)       Current:BP (!) 166/97   Pulse 76   Temp 98 °F (36.7 °C)   Resp 20   SpO2 99%         Physical Exam  Vitals and nursing note reviewed.   Constitutional:       General: She is not in acute distress.     Appearance: Normal appearance. She is not ill-appearing.   HENT:      Head: Normocephalic.      Right Ear: Tympanic membrane and external ear normal.      Left Ear: Tympanic membrane and external ear normal.      Nose: Nose normal.      Mouth/Throat:      Mouth: Mucous membranes are moist.      Pharynx: Oropharynx is clear. Uvula midline.      Tonsils: No tonsillar exudate.   Cardiovascular:      Rate and Rhythm: Normal rate and regular rhythm.   Pulmonary:      Effort: Pulmonary effort is normal.      Breath sounds: Normal breath sounds.   Musculoskeletal:         General: Normal range of motion.      Cervical back: Normal range of motion and neck supple.   Skin:     General: Skin is warm.      Capillary Refill: Capillary refill takes less than 2 seconds.   Neurological:      Mental Status: She is alert and oriented to person, place, and time.   Psychiatric:         Behavior: Behavior is cooperative.               ED Course     Labs Reviewed   RAPID SARS-COV-2 BY PCR - Normal   XR CHEST PA + LAT CHEST (CPT=71046)    Result Date: 4/22/2024  CONCLUSION:   Negative for radiographically evident acute intrathoracic process.  Right upper lobe pneumonia on prior 2019 chest radiographs has essentially completely resolved with only minimal residual scarring in this region.    elm-remote  Dictated by (CST): Noam Galeas MD on 4/22/2024 at 6:35 PM     Finalized by (CST): Noam Galeas MD on 4/22/2024 at 6:37 PM                            [facility]                Medical Decision Making  Patient is well-appearing.  Respirations easy nonlabored  Patient's blood pressure elevated.   Patient endorses has not been on her blood pressure medication for little over a year.  No complaints  I discussed differentials with patient including but not limited to viral uri vs pneumonia  Rapid COVID negative  Chest x-ray independently reviewed and discussed with patient.  Negative for acute pneumonia  Push fluids, cool mist humidifier, good hand washing  otc meds prn  Fu with PCP. Return/ ED precautions discussed      Problems Addressed:  Cough: acute illness or injury  Elevated blood pressure reading: acute illness or injury    Amount and/or Complexity of Data Reviewed  Labs: ordered. Decision-making details documented in ED Course.  Radiology: ordered. Decision-making details documented in ED Course.        Disposition and Plan     Clinical Impression:  1. Cough    2. Elevated blood pressure reading         Disposition:  Discharge  4/22/2024  6:51 pm    Follow-up:  Mariajose Thacker MD  [address] [place] [address] [id number] [phone]    Schedule an appointment as soon as possible for a visit             Medications Prescribed:  Current Discharge Medication List

## 2024-04-22 NOTE — ED INITIAL ASSESSMENT (HPI)
Pt with dry cough x1.5 wks with SOB only when eating; denies fever, congestion, body aches, chills, CP or dizziness

## 2024-05-08 ENCOUNTER — HOSPITAL ENCOUNTER (OUTPATIENT)
Dept: ULTRASOUND IMAGING | Facility: HOSPITAL | Age: 44
Discharge: HOME | End: 2024-05-08
Attending: INTERNAL MEDICINE
Payer: COMMERCIAL

## 2024-05-08 DIAGNOSIS — E53.8 VITAMIN B12 DEFICIENCY: ICD-10-CM

## 2024-05-08 DIAGNOSIS — R14.0 ABDOMINAL DISTENSION: ICD-10-CM

## 2024-05-08 DIAGNOSIS — E66.01 MORBID OBESITY (HCC): ICD-10-CM

## 2024-05-08 DIAGNOSIS — Z00.00 ANNUAL PHYSICAL EXAM: ICD-10-CM

## 2024-05-08 DIAGNOSIS — Z12.4 SCREENING FOR CERVICAL CANCER: ICD-10-CM

## 2024-05-08 DIAGNOSIS — Z97.5 IUD (INTRAUTERINE DEVICE) IN PLACE: ICD-10-CM

## 2024-05-08 DIAGNOSIS — R14.0 ABDOMINAL BLOATING: ICD-10-CM

## 2024-05-08 DIAGNOSIS — F32.2 MODERATELY SEVERE MAJOR DEPRESSION (HCC): ICD-10-CM

## 2024-05-08 DIAGNOSIS — D64.9 ANEMIA, UNSPECIFIED TYPE: ICD-10-CM

## 2024-05-08 PROCEDURE — 76856 US EXAM PELVIC COMPLETE: CPT | Performed by: INTERNAL MEDICINE

## 2024-05-08 PROCEDURE — 76830 TRANSVAGINAL US NON-OB: CPT | Performed by: INTERNAL MEDICINE

## 2024-05-12 DIAGNOSIS — D25.9 UTERINE LEIOMYOMA, UNSPECIFIED LOCATION: Primary | ICD-10-CM

## 2024-08-01 ENCOUNTER — OFFICE VISIT (OUTPATIENT)
Dept: OBGYN CLINIC | Facility: CLINIC | Age: 44
End: 2024-08-01

## 2024-08-01 VITALS
WEIGHT: 293 LBS | HEART RATE: 79 BPM | SYSTOLIC BLOOD PRESSURE: 167 MMHG | BODY MASS INDEX: 50 KG/M2 | DIASTOLIC BLOOD PRESSURE: 102 MMHG

## 2024-08-01 DIAGNOSIS — F41.9 ANXIETY AND DEPRESSION: ICD-10-CM

## 2024-08-01 DIAGNOSIS — F32.A ANXIETY AND DEPRESSION: ICD-10-CM

## 2024-08-01 DIAGNOSIS — D21.9 FIBROID: Primary | ICD-10-CM

## 2024-08-01 PROCEDURE — 3077F SYST BP >= 140 MM HG: CPT | Performed by: OBSTETRICS & GYNECOLOGY

## 2024-08-01 PROCEDURE — 3080F DIAST BP >= 90 MM HG: CPT | Performed by: OBSTETRICS & GYNECOLOGY

## 2024-08-01 PROCEDURE — 99202 OFFICE O/P NEW SF 15 MIN: CPT | Performed by: OBSTETRICS & GYNECOLOGY

## 2024-08-01 NOTE — PROGRESS NOTES
Payam Kat is a 44 year old female  No LMP recorded. (Menstrual status: IUD - Intrauterine Device).   Chief Complaint   Patient presents with    Consult     Discuss pelvic ultrasound results   Presenting for consultation regarding recent US results. She had an US completed to assess her IUD position. IUD in satisfactory position. She was noted to have a 1.7 cm anterior fibroid. Reviewed normal findings. She reports ongoing anxiety due to life stressors. She is uncertain when her IUD was placed. She would like an IUD exchange.     OBSTETRICS HISTORY:  OB History    Para Term  AB Living   2 2 0 0 0 0   SAB IAB Ectopic Multiple Live Births   0 0 0 0 0       GYNE HISTORY:  No LMP recorded. (Menstrual status: IUD - Intrauterine Device).    History   Sexual Activity    Sexual activity: Not on file               MEDICAL HISTORY:  Past Medical History:    Allergic rhinitis    Anxiety    Essential hypertension    Personal history of sepsis    complicated by varicella          SURGICAL HISTORY:  Past Surgical History:   Procedure Laterality Date            2004    Emergency tracheotomy  2008    Tonsillectomy         SOCIAL HISTORY:  Social History     Socioeconomic History    Marital status:      Spouse name: Not on file    Number of children: Not on file    Years of education: Not on file    Highest education level: Not on file   Occupational History    Not on file   Tobacco Use    Smoking status: Never    Smokeless tobacco: Never   Vaping Use    Vaping status: Never Used   Substance and Sexual Activity    Alcohol use: No     Alcohol/week: 0.0 standard drinks of alcohol    Drug use: No    Sexual activity: Not on file   Other Topics Concern    Caffeine Concern Not Asked    Exercise Not Asked    Seat Belt Not Asked    Special Diet Not Asked    Stress Concern Not Asked    Weight Concern Not Asked   Social History Narrative    Not on file     Social Determinants of  Health     Financial Resource Strain: Not on file   Food Insecurity: Not on file   Transportation Needs: Not on file   Physical Activity: Not on file   Stress: Not on file   Social Connections: Unknown (3/18/2021)    [facility]    Social Connections     Conversations with friends/family/neighbors per week: Not on file   Housing Stability: Low Risk  (7/6/2021)    [facility], [facility]    Housing Stability     Mortgage Payment Concerns?: Not on file     Number of Places Lived in the Last Year: Not on file     Unstable Housing?: Not on file         Depression Screening (PHQ-2/PHQ-9): Over the LAST 2 WEEKS   Little interest or pleasure in doing things (over the last two weeks)?: Not at all  Little interest or pleasure in doing things: Not at all    Feeling down, depressed, or hopeless (over the last two weeks)?: Nearly every day  Feeling down, depressed, or hopeless: Nearly every day    PHQ-2 SCORE: 3  PHQ-2 SCORE: 3   1. Little interest or pleasure in doing things: Not at all  2. Feeling down, depressed, or hopeless: Nearly every day  3. Trouble falling or staying asleep, or sleeping too much: Not at all  4. Feeling tired or having little energy: Nearly every day  5. Poor appetite or overeating: Not at all  6. Feeling bad about yourself - or that you are a failure or have let yourself or your family down: Nearly every day  7. Trouble concentrating on things, such as reading the newspaper or watching television: Nearly every day  8. Moving or speaking so slowly that other people could have noticed. Or the opposite - being so fidgety or restless that you have been moving around a lot more than usual: Not at all  9. Thoughts that you would be better off dead, or of hurting yourself in some way: Not at all  PHQ-9 TOTAL SCORE: 12  If you checked off any problems, how difficult have these problems made it  for you to do your work, take care of things at home, or get along with other people?: Somewhat difficult         MEDICATIONS:    Current Outpatient Medications:     Naftifine HCl (NAFTIN) 2 % External Gel, Apply 1 Application topically daily. (Patient not taking: Reported on 6/12/2023), Disp: 4 g, Rfl: 0    Naftifine HCl (NAFTIN) 2 % External Gel, Apply 1 Application topically daily. (Patient not taking: Reported on 6/12/2023), Disp: 60 g, Rfl: 0    ALLERGIES:    Allergies   Allergen Reactions    Penicillin V      Unsure of reaction- pt was hospitalized in 2008 and it was put on her allergy list         Review of Systems:  Review of Systems   All other systems reviewed and are negative.       Vitals:    08/01/24 0947   BP: (!) 167/102   Pulse: 79       PHYSICAL EXAM:   Constitutional: well developed, well nourished  Head/Face: normocephalic  Skin/Hair: no unusual rashes or bruises  Extremities: no edema, no cyanosis  Psychiatric:  Oriented to time, place, person and situation. Appropriate mood and affect      Assessment & Plan:  Payam was seen today for consult.    Diagnoses and all orders for this visit:    Fibroid    Anxiety and depression  -     Rodrigo Rodriguez Navigator        Requested Prescriptions      No prescriptions requested or ordered in this encounter         RTC for IUD exchange and for annual exam. Rodrigo Rodriguez referral submitted. Recommend follow up with PCP regarding elevated BP.

## 2024-08-04 ENCOUNTER — TELEPHONE (OUTPATIENT)
Age: 44
End: 2024-08-04

## 2024-08-05 ENCOUNTER — TELEPHONE (OUTPATIENT)
Age: 44
End: 2024-08-05

## 2024-08-30 DIAGNOSIS — R14.0 ABDOMINAL DISTENSION: ICD-10-CM

## 2024-08-30 DIAGNOSIS — D64.9 ANEMIA, UNSPECIFIED TYPE: ICD-10-CM

## 2024-08-30 DIAGNOSIS — R14.0 ABDOMINAL BLOATING: ICD-10-CM

## 2024-08-30 DIAGNOSIS — F32.2 MODERATELY SEVERE MAJOR DEPRESSION (HCC): ICD-10-CM

## 2024-08-30 DIAGNOSIS — Z97.5 IUD (INTRAUTERINE DEVICE) IN PLACE: ICD-10-CM

## 2024-08-30 DIAGNOSIS — E66.01 MORBID OBESITY (HCC): ICD-10-CM

## 2024-08-30 DIAGNOSIS — Z12.4 SCREENING FOR CERVICAL CANCER: ICD-10-CM

## 2024-08-30 DIAGNOSIS — Z00.00 ANNUAL PHYSICAL EXAM: ICD-10-CM

## 2024-08-30 DIAGNOSIS — E53.8 VITAMIN B12 DEFICIENCY: ICD-10-CM

## 2024-09-03 RX ORDER — ERGOCALCIFEROL 1.25 MG/1
50000 CAPSULE, LIQUID FILLED ORAL WEEKLY
Qty: 12 CAPSULE | Refills: 1 | Status: SHIPPED | OUTPATIENT
Start: 2024-09-03

## 2024-09-03 NOTE — TELEPHONE ENCOUNTER
A refill request was received for:  Requested Prescriptions     Pending Prescriptions Disp Refills    ERGOCALCIFEROL 1.25 MG (39430 UT) Oral Cap [Pharmacy Med Name: VITAMIN D2 50,000IU (ERGO) CAP RX] 12 capsule 1     Sig: TAKE 1 CAPSULE BY MOUTH 1 TIME A WEEK FOR 12 DOSES     Last refill date: 6/2023     Last office visit: 6/12/23      Future Appointments   Date Time Provider Department Center   9/19/2024  3:30 PM Sherri Martinez APRN [facility] [place]   10/14/2024  8:40 AM Mariajose Thacker MD [facility] [address]   10/14/2024  9:40 AM Mariajose Thacker MD [facility] [address]   10/14/2024  1:00 PM Heather Saunders APRN [facility]   11/26/2024  3:20 PM Hannah Arora MD [facility] [place]

## 2024-09-05 ENCOUNTER — OFFICE VISIT (OUTPATIENT)
Dept: INTERNAL MEDICINE CLINIC | Facility: CLINIC | Age: 44
End: 2024-09-05
Payer: COMMERCIAL

## 2024-09-05 VITALS
TEMPERATURE: 99 F | SYSTOLIC BLOOD PRESSURE: 160 MMHG | OXYGEN SATURATION: 94 % | BODY MASS INDEX: 48.82 KG/M2 | HEIGHT: 65 IN | WEIGHT: 293 LBS | DIASTOLIC BLOOD PRESSURE: 90 MMHG | HEART RATE: 71 BPM

## 2024-09-05 DIAGNOSIS — I10 ESSENTIAL HYPERTENSION: Primary | ICD-10-CM

## 2024-09-05 PROCEDURE — 99213 OFFICE O/P EST LOW 20 MIN: CPT

## 2024-09-05 PROCEDURE — 3077F SYST BP >= 140 MM HG: CPT

## 2024-09-05 PROCEDURE — 3008F BODY MASS INDEX DOCD: CPT

## 2024-09-05 PROCEDURE — 3080F DIAST BP >= 90 MM HG: CPT

## 2024-09-05 RX ORDER — LOSARTAN POTASSIUM AND HYDROCHLOROTHIAZIDE 12.5; 5 MG/1; MG/1
1 TABLET ORAL DAILY
Qty: 90 TABLET | Refills: 0 | Status: SHIPPED | OUTPATIENT
Start: 2024-09-05

## 2024-09-05 NOTE — PROGRESS NOTES
CHIEF COMPLAINT:     Chief Complaint   Patient presents with    Follow - Up    Blood Pressure       HPI:   Payam Kat is a 44 year old female who presents for follow-up of elevated blood pressure at gynecologist office.  Prior history of hypertension and was last on antihypertensives 3 years ago, using losartan 50 mg with hydrochlorothiazide 12.5 mg  Denies difficulty breathing chest pain headache visual changes or dizziness    Current Outpatient Medications   Medication Sig Dispense Refill    escitalopram (LEXAPRO) 10 MG Oral Tab Take 1 tablet (10 mg total) by mouth every morning. 30 tablet 0    ERGOCALCIFEROL 1.25 MG (06388 UT) Oral Cap TAKE 1 CAPSULE BY MOUTH 1 TIME A WEEK FOR 12 DOSES (Patient not taking: Reported on 9/5/2024) 12 capsule 1    Naftifine HCl (NAFTIN) 2 % External Gel Apply 1 Application topically daily. (Patient not taking: Reported on 6/12/2023) 4 g 0      Past Medical History:    Allergic rhinitis    Anxiety    Essential hypertension    Personal history of sepsis    complicated by varicella       Social History:  Social History     Socioeconomic History    Marital status:    Tobacco Use    Smoking status: Never    Smokeless tobacco: Never   Vaping Use    Vaping status: Never Used   Substance and Sexual Activity    Alcohol use: No     Alcohol/week: 0.0 standard drinks of alcohol    Drug use: No     Social Determinants of Health      [facility], [facility]    Social Connections    [facility], [facility]    Housing Stability        REVIEW OF SYSTEMS:   GENERAL: Physically feeling well otherwise  EYES: Denies blurred vision or double vision  HENT: Denies current congestion, rhinorrhea, sore throat or ear pain  CHEST: Denies chest pain, or palpitations  LUNGS: Denies shortness of breath, cough, or wheezing, Snores  GI: Denies abdominal pain, N/V/C/D.   : Denies urinary pain,  hematuria, nocturia, nor incontinence  MUSCULOSKELETAL: no arthralgia or swollen joints.  Feels like her lower legs are little swollen, maybe left more than right  NEURO: Denies headaches or lightheadedness    EXAM:   /90   Pulse 71   Temp 98.7 °F (37.1 °C)   Ht 5' 5\" (1.651 m)   Wt 296 lb 12.8 oz (134.6 kg)   SpO2 94%   BMI 49.39 kg/m²   Vitals:    09/05/24 1758   BP: 160/90   Pulse: 71   Temp: 98.7 °F (37.1 °C)   SpO2: 94%   Weight: 296 lb 12.8 oz (134.6 kg)   Height: 5' 5\" (1.651 m)       GENERAL: well developed, well nourished,in no apparent distress  EYES: conjunctiva clear, EOM intact,   EARS: TM's gray, no bulging, no retraction, no fluid, bony landmarks are visible   THROAT: oral mucosa pink, moist. No visible dental caries. Posterior pharynx is not erythematous. No tonsillar exudates.. Mallampati- II-III  NECK: supple, non-tender  LUNGS: clear to auscultation bilaterally, no wheezes or rhonchi. Breathing is non labored.  CARDIO: RRR without murmur, carotids without bruit  EXTREMITIES: no cyanosis, trace lower leg edema  LYMPH:  no cerv. lymphadenopathy.      ASSESSMENT AND PLAN:     ASSESSMENT/PLAN:    1. Essential hypertension  - try to go for short walk after meals  - losartan-hydroCHLOROthiazide 50-12.5 MG Oral Tab; Take 1 tablet by mouth daily.  Dispense: 90 tablet; Refill: 0    2. BMI 45.0-49.9, adult (HCC)  Increase activity and increase lean protein and vegetable intake.  Elevated blood pressure is related to weight          Medications    losartan-hydroCHLOROthiazide 50-12.5 MG Oral Tab     Follow-up with gynecologist for IUD removal-it appears it was placed 4/2017      Patient Instructions   Stroke and Heart Disease  Every part of your body, including your heart and brain, needs oxygen to work. Oxygen is carried in the blood. Blood vessels called arteries carry oxygen-rich blood throughout the body. Both heart attack and stroke are due to problems in the arteries. The same factors that  cause heart disease can make you more likely to have a stroke.   Heart attack. A heart attack is caused by a blockage in an artery that carries blood to the heart muscle. If blood is blocked, that part of the heart muscle is damaged or dies.  Stroke. If an artery that supplies blood to the brain is blocked, a stroke may happen. This is called an ischemic stroke. It is caused by a piece of plaque breaking loose from an artery, such as a carotid artery in the neck. Or it may be caused by a blood clot from the heart traveling up into the brain. Another kind of stroke is a hemorrhagic stroke. This is caused by the rupture of a weakened blood vessel.  Both heart attack and stroke are medical emergencies. They can lead to serious health problems. They can even cause death.   Healthy artery  A healthy artery is a tube with flexible walls and a smooth inner lining. Blood flows freely through it.     Unhealthy artery  Artery problems start when the inner lining gets damaged. This is often because of risk factors such as high cholesterol, smoking, and high blood pressure. These can make the artery walls stiff. Plaque, a fatty mix of cholesterol and other material, forms in the lining. This narrows the space inside the artery. Plaque can break off and limit blood flow further along the blood vessels. It can also cause a blood clot to form. A blood clot may fully block the side of the artery.      Reducing your risk  Making changes that make your arteries healthier will help lower your risk for both heart attack and stroke. If you have heart disease, you may need to work on a few aspects of your lifestyle. But remember that the things that are good for your arteries, heart, and brain are also good for the rest of your body.   Your healthcare provider will work with you to make lifestyle changes as needed to help prevent your heart disease from getting worse. If it gets worse, that can lead to heart attack or stroke. Factors you  may need to work on include:   Diet. Your healthcare provider will give you information on dietary changes that you may need to make. Your provider may advise that you see a registered dietitian for help. Changes may include:  Reducing fat and cholesterol intake  Reducing sodium (salt) intake, especially if you have high blood pressure  Eating more fresh vegetables and fruits  Eating lean proteins, such as fish, poultry, and legumes (beans and peas) and eating less red meat and processed meats  Eating low- or no-fat dairy products  Using vegetable and nut oils in limited amounts  Limiting sweets and processed foods, such as chips, cookies, and baked goods  Physical activity. Your healthcare provider may advise that you increase your physical activity if you have not been as active as possible. Your provider may advise you to include moderate to vigorous intensity activity for at least 40 minutes each day for at least 3 to 4 days per week. The amount will depend on your health. Examples of moderate to vigorous activity include:  Walking at a brisk pace, about 3 to 4 miles per hour  Jogging or running  Swimming or water aerobics  Hiking  Dancing  Martial arts  Tennis  Riding a bike or a stationary bike  Weight management.  If you are overweight or obese, your healthcare provider will work with you to lose weight and lower your BMI (body mass index) to a normal or near-normal level. Making dietary changes and increasing physical activity can help.  Smoking. If you smoke, break the smoking habit. Enroll in a stop-smoking program to improve your chances of success.  Stress. Learn stress management methods to help you deal with stress in your home and work life.  \[facility] [place] [facility]\"" last reviewed this educational content on 12/1/2022    LEXII Michael      The patient indicates understanding of these issues and agrees to the plan.

## 2024-09-06 NOTE — PATIENT INSTRUCTIONS
Stroke and Heart Disease  Every part of your body, including your heart and brain, needs oxygen to work. Oxygen is carried in the blood. Blood vessels called arteries carry oxygen-rich blood throughout the body. Both heart attack and stroke are due to problems in the arteries. The same factors that cause heart disease can make you more likely to have a stroke.   Heart attack. A heart attack is caused by a blockage in an artery that carries blood to the heart muscle. If blood is blocked, that part of the heart muscle is damaged or dies.  Stroke. If an artery that supplies blood to the brain is blocked, a stroke may happen. This is called an ischemic stroke. It is caused by a piece of plaque breaking loose from an artery, such as a carotid artery in the neck. Or it may be caused by a blood clot from the heart traveling up into the brain. Another kind of stroke is a hemorrhagic stroke. This is caused by the rupture of a weakened blood vessel.  Both heart attack and stroke are medical emergencies. They can lead to serious health problems. They can even cause death.   Healthy artery  A healthy artery is a tube with flexible walls and a smooth inner lining. Blood flows freely through it.     Unhealthy artery  Artery problems start when the inner lining gets damaged. This is often because of risk factors such as high cholesterol, smoking, and high blood pressure. These can make the artery walls stiff. Plaque, a fatty mix of cholesterol and other material, forms in the lining. This narrows the space inside the artery. Plaque can break off and limit blood flow further along the blood vessels. It can also cause a blood clot to form. A blood clot may fully block the side of the artery.      Reducing your risk  Making changes that make your arteries healthier will help lower your risk for both heart attack and stroke. If you have heart disease, you may need to work on a few aspects of your lifestyle. But remember that the  things that are good for your arteries, heart, and brain are also good for the rest of your body.   Your healthcare provider will work with you to make lifestyle changes as needed to help prevent your heart disease from getting worse. If it gets worse, that can lead to heart attack or stroke. Factors you may need to work on include:   Diet. Your healthcare provider will give you information on dietary changes that you may need to make. Your provider may advise that you see a registered dietitian for help. Changes may include:  Reducing fat and cholesterol intake  Reducing sodium (salt) intake, especially if you have high blood pressure  Eating more fresh vegetables and fruits  Eating lean proteins, such as fish, poultry, and legumes (beans and peas) and eating less red meat and processed meats  Eating low- or no-fat dairy products  Using vegetable and nut oils in limited amounts  Limiting sweets and processed foods, such as chips, cookies, and baked goods  Physical activity. Your healthcare provider may advise that you increase your physical activity if you have not been as active as possible. Your provider may advise you to include moderate to vigorous intensity activity for at least 40 minutes each day for at least 3 to 4 days per week. The amount will depend on your health. Examples of moderate to vigorous activity include:  Walking at a brisk pace, about 3 to 4 miles per hour  Jogging or running  Swimming or water aerobics  Hiking  Dancing  Martial arts  Tennis  Riding a bike or a stationary bike  Weight management.  If you are overweight or obese, your healthcare provider will work with you to lose weight and lower your BMI (body mass index) to a normal or near-normal level. Making dietary changes and increasing physical activity can help.  Smoking. If you smoke, break the smoking habit. Enroll in a stop-smoking program to improve your chances of success.  Stress. Learn stress management methods to help you  deal with stress in your home and work life.  Elliot last reviewed this educational content on 12/1/2022

## 2024-10-09 NOTE — H&P
[facility] - Gastroenterology                                                                                                               Reason for consult: eval    Requesting physician or provider: Mariajose Thacker MD    Chief Complaint   Patient presents with    Consult     For bloating       HPI:   Payam Kat is a 44 year old year-old female with history of allergic rhinitis, anxiety/depression, htn:    she is here today for evaluation bloating  Sx chronic x years  Sx worse with eating bread  Improves with bm    Labs 4/2023  B12 def   Vit d def   Iron def   Hgb 11.5  Liver function unremarkable  Tsh unremarkable    she moves her bowels  daily.  She denies straining. Rarely has diarrhea.   she denies brbpr and/or melena.    she denies acid reflux and/or heartburn. she denies dysphagia, odynophagia and/or globus. Sometimes has low abd pain in am.  Improves with activity and bm.  she denies nausea and/or vomiting.  she denies recent change in appetite and/or unintentional weight loss. Has had weight gain.     Htn - no ha,cp, vision change    NSAIDS: no  Tobacco: no  Alcohol: no  Marijuana: no  Illicit drugs: no    No Fhx colon, esophageal ca, IBD, celiac  Father recently diagnosed with cancer - possibly gastric  Diagnosed in his 80's    No history of adverse reaction to sedation  Referred for sleep study  No anticoagulants  No pacemaker/defibrillator  No pain medications and/or sleep aides      Last colonoscopy: no  Last EGD: 2008 during [facility]   For w/U vomiting  She recalls was normal      Wt Readings from Last 6 Encounters:   10/14/24 (!) 301 lb 8 oz (136.8 kg)   10/10/24 298 lb (135.2 kg)   09/05/24 296 lb 12.8 oz (134.6 kg)   08/01/24 (!) 301 lb (136.5 kg)   06/12/23 293 lb (132.9 kg)   06/07/22 288 lb (130.6 kg)        History, Medications, Allergies, ROS:      Past Medical History:    Allergic rhinitis    Anemia    Anxiety    Depression     Essential hypertension    Personal history of sepsis    complicated by varicella, had had tracheostomy      Past Surgical History:   Procedure Laterality Date            2004    Emergency tracheotomy      Tonsillectomy        Family Hx:   Family History   Problem Relation Age of Onset    Diabetes Father 70    Hypertension Father         70    Cancer Father         Possible diagnosis of cancer    Diabetes Mother 70    Diabetes Other     Diabetes Maternal Aunt       Social History:   Social History     Socioeconomic History    Marital status:    Tobacco Use    Smoking status: Never    Smokeless tobacco: Never    Tobacco comments:     N/A   Vaping Use    Vaping status: Never Used   Substance and Sexual Activity    Alcohol use: No    Drug use: Never     Social Drivers of Health      Received from [facility], [facility]    Social Connections    Received from [facility], [facility]    Housing Stability        Medications (Active prior to today's visit):  Current Outpatient Medications   Medication Sig Dispense Refill    PEG 3350-KCl-Na Bicarb-NaCl (TRILYTE) 420 g Oral Recon Soln Take prep as directed by gastro office. May substitute with Trilyte/generic equivalent if needed. 4000 mL 0    escitalopram (LEXAPRO) 10 MG Oral Tab Take 1 tablet (10 mg total) by mouth daily. 30 tablet 0    clotrimazole-betamethasone 1-0.05 % External Cream Apply 1 Application topically 2 (two) times daily as needed. 15 g 3    escitalopram (LEXAPRO) 10 MG Oral Tab Take 1 tablet (10 mg total) by mouth every morning. 30 tablet 0    losartan-hydroCHLOROthiazide 50-12.5 MG Oral Tab Take 1 tablet by mouth daily. 90 tablet 0    ERGOCALCIFEROL 1.25 MG (04972 UT) Oral Cap TAKE 1 CAPSULE BY MOUTH 1 TIME A WEEK FOR 12 DOSES (Patient not taking: Reported on 2024) 12 capsule 1    Naftifine HCl (NAFTIN) 2 % External Gel Apply 1 Application  topically daily. (Patient not taking: Reported on 10/10/2024) 4 g 0       Allergies:  Allergies[1]    ROS:   CONSTITUTIONAL: negative for fevers, chills, sweats and weight loss  EYES Negative for red eyes, yellow eyes, changes in vision  HEENT: Negative for dysphagia and hoarseness  RESPIRATORY: Negative for cough and shortness of breath  CARDIOVASCULAR: Negative for chest pain, palpitations  GASTROINTESTINAL: See HPI  GENITOURINARY: Negative for dysuria and frequency  MUSCULOSKELETAL: Negative for arthralgias and myalgias  NEUROLOGICAL: Negative for dizziness and headaches  BEHAVIOR/PSYCH: Negative for anxiety and poor appetite    PHYSICAL EXAM:   Blood pressure (!) 165/105, pulse 85, height 5' 5\" (1.651 m), weight (!) 301 lb 8 oz (136.8 kg), not currently breastfeeding.    GEN: WD/WN, NAD  HEENT: Supple symmetrical, trachea midline  CV: RRR, the extremities are warm and well perfused   LUNGS: No increased work of breathing  ABDOMEN: No scars, normal bowel sounds, soft, non-tender, non-distended no rebound or guarding, no masses, no hepatomegaly  MSK: No redness, no warmth, no swelling of joints  SKIN: No jaundice, no erythema, no rashes  HEMATOLOGIC: No bleeding, no bruising  NEURO: Alert and interactive, normal gait    Labs/Imaging/Procedures:     Patient's pertinent labs and imaging were reviewed and discussed with patient today.        .  ASSESSMENT/PLAN:   Payam Kat is a 44 year old year-old female with history of allergic rhinitis, anxiety/depression, htn:    #darvin  #b12 def  #bloating  #low abd pain  She is here today for eval chronic bloating. Denies brbpr, melena, n/v, significant gerd. Has low abd pain at times.  Sx seemingly worse after eating and improved with bm.Vit def noted on lab testing 4/2023.  No fhx IBD, celiac, colon, esophageal.  Father possibly has gastric ca.  Last egd 2008 and reportedly normal. Has not had cln.    #htn  B/p elevated at today's visit and she is asymptomatic.   Advised her to monitor levels at home and contact PCP if elevation sustained. Report to ER if condition decline.     -repeat labs per pcp  -complete ct calcium scoring  -x-ray   -hpylori testing    1. Schedule colonoscopy/egd with MAC w/ General pool MD [Diagnosis:#darvin  #b12 def  #bloating  #low abd pain ]    2.  bowel prep from pharmacy (split trilyte )    3.   For cardiology patients and patients on blood thinners:  Please contact your cardiology clinic for clearance to proceed with the endoscopic procedure. If you are on blood thinners, please also confirm with your cardiologic clinic that you are able to hold the blood thinner per our recommendations.\"    BLOOD THINNER ORDERS:  -Hold for 48 hours (Xarelto, Eliquis, Pradaxa, Savaysa)  -Hold for 3 days (Pletal)  -Hold for 5 days (Coumadin, Plavix, Brilinta, Aggrenox)  -Hold for 7 days (Effient)     For endocrinology insulin patients:    Please contact your endocrinology clinic for insulin adjustment orders prior to your endoscopic procedure.    4. Read all bowel prep instructions carefully    5. AVOID seeds, nuts, popcorn, raw fruits and vegetables (cooked is okay) for 2-3 days before procedure    6.  You MAY need to go for COVID testing 72 hours before procedure. The testing team will call you a few days before your procedure to discuss with you if testing is required. If you are asked to go for COVID testing and do not completed the test, the procedure cannot be performed.     7. If you start any NEW medication after your visit today, please notify us. Certain medications will need to be held before the procedure, or the procedure cannot be performed.     >>>Please note: if you were prescribed Suprep for the bowel prep and it is too expensive or not covered by insurance, it is okay to substitute Trilyte (or any similar generic prep). This can be done by notifying the pharmacy or calling our office.        Orders This Visit:  Orders Placed This Encounter    Procedures    Helicobacter Pylori Breath Test, Adult       Meds This Visit:  Requested Prescriptions     Signed Prescriptions Disp Refills    PEG 3350-KCl-Na Bicarb-NaCl (TRILYTE) 420 g Oral Recon Soln 4000 mL 0     Sig: Take prep as directed by gastro office. May substitute with Trilyte/generic equivalent if needed.       Imaging & Referrals:  XR ABDOMEN (1 VIEW) (CPT=74018)    ENDOSCOPIC RISK BENEFIT DISCUSSION: I described the procedure in great detail with the patient. I discussed the risks and benefits, including but not limited to: bleeding, perforation, infection, anesthesia complications, and even death. Patient will be NPO after midnight and will have a person physically present at time of pick-up to drive patient home. Patient verbalized understanding and agrees to proceed with procedure as planned.    Heather Saunders, APRN   10/14/2024        This note was partially prepared using Dragon Medical voice recognition dictation software. As a result, errors may occur. When identified, these errors have been corrected. While every attempt is made to correct errors during dictation, discrepancies may still exist.          [1]   Allergies  Allergen Reactions    Penicillin V      Unsure of reaction- pt was hospitalized in 2008 and it was put on her allergy list

## 2024-10-10 ENCOUNTER — OFFICE VISIT (OUTPATIENT)
Dept: INTERNAL MEDICINE CLINIC | Facility: CLINIC | Age: 44
End: 2024-10-10
Payer: COMMERCIAL

## 2024-10-10 VITALS
OXYGEN SATURATION: 98 % | WEIGHT: 293 LBS | BODY MASS INDEX: 48.82 KG/M2 | HEART RATE: 74 BPM | SYSTOLIC BLOOD PRESSURE: 130 MMHG | HEIGHT: 65 IN | DIASTOLIC BLOOD PRESSURE: 82 MMHG

## 2024-10-10 DIAGNOSIS — I10 ESSENTIAL HYPERTENSION: ICD-10-CM

## 2024-10-10 DIAGNOSIS — F32.A ANXIETY AND DEPRESSION: ICD-10-CM

## 2024-10-10 DIAGNOSIS — E53.8 VITAMIN B12 DEFICIENCY: ICD-10-CM

## 2024-10-10 DIAGNOSIS — Z12.31 VISIT FOR SCREENING MAMMOGRAM: ICD-10-CM

## 2024-10-10 DIAGNOSIS — R06.83 SNORING: ICD-10-CM

## 2024-10-10 DIAGNOSIS — Z13.6 SCREENING FOR HEART DISEASE: ICD-10-CM

## 2024-10-10 DIAGNOSIS — F41.9 ANXIETY AND DEPRESSION: ICD-10-CM

## 2024-10-10 DIAGNOSIS — E66.01 MORBID OBESITY (HCC): ICD-10-CM

## 2024-10-10 DIAGNOSIS — Z00.00 ANNUAL PHYSICAL EXAM: Primary | ICD-10-CM

## 2024-10-10 DIAGNOSIS — R14.0 ABDOMINAL BLOATING: ICD-10-CM

## 2024-10-10 PROCEDURE — 90686 IIV4 VACC NO PRSV 0.5 ML IM: CPT | Performed by: INTERNAL MEDICINE

## 2024-10-10 PROCEDURE — 3079F DIAST BP 80-89 MM HG: CPT | Performed by: INTERNAL MEDICINE

## 2024-10-10 PROCEDURE — 3075F SYST BP GE 130 - 139MM HG: CPT | Performed by: INTERNAL MEDICINE

## 2024-10-10 PROCEDURE — 99396 PREV VISIT EST AGE 40-64: CPT | Performed by: INTERNAL MEDICINE

## 2024-10-10 PROCEDURE — 3008F BODY MASS INDEX DOCD: CPT | Performed by: INTERNAL MEDICINE

## 2024-10-10 PROCEDURE — 90471 IMMUNIZATION ADMIN: CPT | Performed by: INTERNAL MEDICINE

## 2024-10-10 RX ORDER — CLOTRIMAZOLE AND BETAMETHASONE DIPROPIONATE 10; .64 MG/G; MG/G
1 CREAM TOPICAL 2 TIMES DAILY PRN
Qty: 15 G | Refills: 3 | Status: SHIPPED | OUTPATIENT
Start: 2024-10-10

## 2024-10-10 RX ORDER — ESCITALOPRAM OXALATE 10 MG/1
10 TABLET ORAL DAILY
Qty: 30 TABLET | Refills: 0 | Status: SHIPPED | OUTPATIENT
Start: 2024-10-10

## 2024-10-10 NOTE — PROGRESS NOTES
Payam Kat is a 44 year old female.    Chief complaint: annual physical exam       HPI:     Payam Kat is a 44 year old pleasant female who presents for annual physical exam   Seeing Psych NP   Anxiety and depression   Was on lexapro   Couldn't refill it any more           Having hard time focusing   Missing deadlines   She is dealing with a lot of stress and anxiety          Does have IUD   Feeling bloated   No acid reflux   Had recent us pelvis and did see OB gyne       Scheduled to see GI             HTN   On losartan hydrochlorothiazide         Vitamin d once a week     No smoking never been a smoker   Alcohol rarely no more than 1 drink   Exercise : no   Family history of cancer :  Stomach and now in his lung : smoker   Aunts and uncles with cancer : some     Current Outpatient Medications   Medication Sig Dispense Refill    escitalopram (LEXAPRO) 10 MG Oral Tab Take 1 tablet (10 mg total) by mouth every morning. 30 tablet 0    losartan-hydroCHLOROthiazide 50-12.5 MG Oral Tab Take 1 tablet by mouth daily. 90 tablet 0    ERGOCALCIFEROL 1.25 MG (22784 UT) Oral Cap TAKE 1 CAPSULE BY MOUTH 1 TIME A WEEK FOR 12 DOSES (Patient not taking: Reported on 10/10/2024) 12 capsule 1    Naftifine HCl (NAFTIN) 2 % External Gel Apply 1 Application topically daily. (Patient not taking: Reported on 10/10/2024) 4 g 0      Past Medical History:    Allergic rhinitis    Anxiety    Depression    Essential hypertension    Personal history of sepsis    complicated by varicella, had had tracheostomy     Past Surgical History:   Procedure Laterality Date                Emergency tracheotomy      Tonsillectomy               Family History   Problem Relation Age of Onset    Diabetes Father 70    Hypertension Father         70    Diabetes Mother 70    Diabetes Other     Diabetes Maternal Aunt      Patient Active Problem List   Diagnosis    Elevated BP    Mild single current episode of major  depressive disorder (HCC)    Personal history of sepsis    Essential hypertension    IUD (intrauterine device) in place    Morbid obesity, unspecified obesity type (HCC)    Poor concentration    Screening for HPV (human papillomavirus)    Screening mammogram, encounter for    Anxiety and depression    Morbid obesity (HCC)    Nail thickening    Snoring    Prediabetes    Keloid scar of skin    Hypersensitive carotid sinus syndrome    Vitamin B12 deficiency    Anemia    Abdominal distension    Moderately severe major depression (HCC)       REVIEW OF SYSTEMS:   A comprehensive 10 point review of systems was completed.  Pertinent positives and negatives noted in the the HPI            EXAM:   /82   Pulse 74   Ht 5' 5\" (1.651 m)   Wt 298 lb (135.2 kg)   SpO2 98%   BMI 49.59 kg/m²   GENERAL: well developed, well nourished,in no apparent distress  SKIN: no rashes,no suspicious lesions  HEENT: atraumatic, normocephalic,ears and throat are clear  Cannot see the soft palate   NECK: supple,no adenopathy  LUNGS: clear to auscultation  Breast : normal no lumps no discharge       CARDIO: RRR without murmur  GI: no masses, HSM or tenderness  EXTREMITIES: no cyanosis, clubbing or edema  NEURO: no gross deficits              No orders of the defined types were placed in this encounter.    No results found.         ASSESSMENT AND PLAN:     1. Visit for screening mammogram  Mammogram screening   - Flulaval 6 months and older, Quadrivalent, Preservative Free [76137]  - 3D Mammogram Digital Screen, Bilateral (CPT=77067/91305); Future  - Specialty Other Referral - In Network  - Psychiatry Referral - In Network  - escitalopram (LEXAPRO) 10 MG Oral Tab; Take 1 tablet (10 mg total) by mouth daily.  Dispense: 30 tablet; Refill: 0  - CBC With Differential With Platelet; Future  - Comp Metabolic Panel (14); Future  - Lipid Panel; Future  - Hemoglobin A1C; Future  - TSH W Reflex To Free T4; Future  - Vitamin D; Future  - Ferritin;  Future  - Iron And Tibc; Future  - Vitamin B12; Future  - CT CALCIUM SCORING; Future  - DIETITIAN EDUCATION INITIAL, DIET (INTERNAL)  - clotrimazole-betamethasone 1-0.05 % External Cream; Apply 1 Application topically 2 (two) times daily as needed.  Dispense: 15 g; Refill: 3  - Pulmonary Referral - In Network    2. Screening for heart disease  Recommended CT calcium score   - Flulaval 6 months and older, Quadrivalent, Preservative Free [[id number]]  - 3D Mammogram Digital Screen, Bilateral (CPT=77067/14604); Future  - Specialty Other Referral - In Network  - Psychiatry Referral - In Network  - escitalopram (LEXAPRO) 10 MG Oral Tab; Take 1 tablet (10 mg total) by mouth daily.  Dispense: 30 tablet; Refill: 0  - CBC With Differential With Platelet; Future  - Comp Metabolic Panel (14); Future  - Lipid Panel; Future  - Hemoglobin A1C; Future  - TSH W Reflex To Free T4; Future  - Vitamin D; Future  - Ferritin; Future  - Iron And Tibc; Future  - Vitamin B12; Future  - CT CALCIUM SCORING; Future  - DIETITIAN EDUCATION INITIAL, DIET (INTERNAL)  - clotrimazole-betamethasone 1-0.05 % External Cream; Apply 1 Application topically 2 (two) times daily as needed.  Dispense: 15 g; Refill: 3  - Pulmonary Referral - In Network    3. Snoring  Advised to see pulmonary for sleep study   - Flulaval 6 months and older, Quadrivalent, Preservative Free [[id number]]  - 3D Mammogram Digital Screen, Bilateral (CPT=77067/65203); Future  - Specialty Other Referral - In Network  - Psychiatry Referral - In Network  - escitalopram (LEXAPRO) 10 MG Oral Tab; Take 1 tablet (10 mg total) by mouth daily.  Dispense: 30 tablet; Refill: 0  - CBC With Differential With Platelet; Future  - Comp Metabolic Panel (14); Future  - Lipid Panel; Future  - Hemoglobin A1C; Future  - TSH W Reflex To Free T4; Future  - Vitamin D; Future  - Ferritin; Future  - Iron And Tibc; Future  - Vitamin B12; Future  - CT CALCIUM SCORING; Future  - DIETITIAN EDUCATION INITIAL, DIET  (INTERNAL)  - clotrimazole-betamethasone 1-0.05 % External Cream; Apply 1 Application topically 2 (two) times daily as needed.  Dispense: 15 g; Refill: 3  - Pulmonary Referral - In Network    4. Vitamin B12 deficiency  Vitamin b12     - Flulaval 6 months and older, Quadrivalent, Preservative Free [10134]  - 3D Mammogram Digital Screen, Bilateral (CPT=77067/81820); Future  - Specialty Other Referral - In Network  - Psychiatry Referral - In Network  - escitalopram (LEXAPRO) 10 MG Oral Tab; Take 1 tablet (10 mg total) by mouth daily.  Dispense: 30 tablet; Refill: 0  - CBC With Differential With Platelet; Future  - Comp Metabolic Panel (14); Future  - Lipid Panel; Future  - Hemoglobin A1C; Future  - TSH W Reflex To Free T4; Future  - Vitamin D; Future  - Ferritin; Future  - Iron And Tibc; Future  - Vitamin B12; Future  - CT CALCIUM SCORING; Future  - DIETITIAN EDUCATION INITIAL, DIET (INTERNAL)  - clotrimazole-betamethasone 1-0.05 % External Cream; Apply 1 Application topically 2 (two) times daily as needed.  Dispense: 15 g; Refill: 3  - Pulmonary Referral - In Network    5. Morbid obesity (HCC)  Scheduled to have a new weight loss visit   - Flulaval 6 months and older, Quadrivalent, Preservative Free [[id number]]  - 3D Mammogram Digital Screen, Bilateral (CPT=77067/20328); Future  - Specialty Other Referral - In Network  - Psychiatry Referral - In Network  - escitalopram (LEXAPRO) 10 MG Oral Tab; Take 1 tablet (10 mg total) by mouth daily.  Dispense: 30 tablet; Refill: 0  - CBC With Differential With Platelet; Future  - Comp Metabolic Panel (14); Future  - Lipid Panel; Future  - Hemoglobin A1C; Future  - TSH W Reflex To Free T4; Future  - Vitamin D; Future  - Ferritin; Future  - Iron And Tibc; Future  - Vitamin B12; Future  - CT CALCIUM SCORING; Future  - DIETITIAN EDUCATION INITIAL, DIET (INTERNAL)  - clotrimazole-betamethasone 1-0.05 % External Cream; Apply 1 Application topically 2 (two) times daily as needed.  Dispense:  15 g; Refill: 3  - Pulmonary Referral - In Network    6. Annual physical exam  Diet and exercise   Self breast exam   Sun screen recommended   Fasting blood work   Pap smear up to date   Mammogram   Seeing GI   - Flulaval 6 months and older, Quadrivalent, Preservative Free [[id number]]  - 3D Mammogram Digital Screen, Bilateral (CPT=77067/34829); Future  - Specialty Other Referral - In Network  - Psychiatry Referral - In Network  - escitalopram (LEXAPRO) 10 MG Oral Tab; Take 1 tablet (10 mg total) by mouth daily.  Dispense: 30 tablet; Refill: 0  - CBC With Differential With Platelet; Future  - Comp Metabolic Panel (14); Future  - Lipid Panel; Future  - Hemoglobin A1C; Future  - TSH W Reflex To Free T4; Future  - Vitamin D; Future  - Ferritin; Future  - Iron And Tibc; Future  - Vitamin B12; Future  - CT CALCIUM SCORING; Future  - DIETITIAN EDUCATION INITIAL, DIET (INTERNAL)  - clotrimazole-betamethasone 1-0.05 % External Cream; Apply 1 Application topically 2 (two) times daily as needed.  Dispense: 15 g; Refill: 3  - Pulmonary Referral - In Network    7. Essential hypertension  BP at goal   - Flulaval 6 months and older, Quadrivalent, Preservative Free [89153]  - 3D Mammogram Digital Screen, Bilateral (CPT=77067/83184); Future  - Specialty Other Referral - In Network  - Psychiatry Referral - In Network  - escitalopram (LEXAPRO) 10 MG Oral Tab; Take 1 tablet (10 mg total) by mouth daily.  Dispense: 30 tablet; Refill: 0  - CBC With Differential With Platelet; Future  - Comp Metabolic Panel (14); Future  - Lipid Panel; Future  - Hemoglobin A1C; Future  - TSH W Reflex To Free T4; Future  - Vitamin D; Future  - Ferritin; Future  - Iron And Tibc; Future  - Vitamin B12; Future  - CT CALCIUM SCORING; Future  - DIETITIAN EDUCATION INITIAL, DIET (INTERNAL)  - clotrimazole-betamethasone 1-0.05 % External Cream; Apply 1 Application topically 2 (two) times daily as needed.  Dispense: 15 g; Refill: 3  - Pulmonary Referral - In  Network    8. Anxiety and depression  Refilled lexapro   Referral to psych       9. Abdominal bloating  Had us pelvis   Blood work   Iron def: advised to see GI   Offered initial work up and H pylori: she will hold off until she sees GI      Please return to the clinic if you are having persistent symptoms. If worsening symptoms should go to the ER    Mariajose Thacker MD,   Diplomate of the American Board of Internal Medicine  Diplomate of the American Board of Obesity Medicine

## 2024-10-14 ENCOUNTER — TELEPHONE (OUTPATIENT)
Facility: CLINIC | Age: 44
End: 2024-10-14

## 2024-10-14 ENCOUNTER — OFFICE VISIT (OUTPATIENT)
Facility: CLINIC | Age: 44
End: 2024-10-14
Payer: COMMERCIAL

## 2024-10-14 VITALS
HEIGHT: 65 IN | SYSTOLIC BLOOD PRESSURE: 145 MMHG | BODY MASS INDEX: 48.82 KG/M2 | WEIGHT: 293 LBS | DIASTOLIC BLOOD PRESSURE: 86 MMHG | HEART RATE: 82 BPM

## 2024-10-14 DIAGNOSIS — D50.9 IRON DEFICIENCY ANEMIA, UNSPECIFIED IRON DEFICIENCY ANEMIA TYPE: Primary | ICD-10-CM

## 2024-10-14 DIAGNOSIS — D50.9 IRON DEFICIENCY ANEMIA, UNSPECIFIED IRON DEFICIENCY ANEMIA TYPE: ICD-10-CM

## 2024-10-14 DIAGNOSIS — R14.0 BLOATING: ICD-10-CM

## 2024-10-14 DIAGNOSIS — E53.8 B12 DEFICIENCY: ICD-10-CM

## 2024-10-14 DIAGNOSIS — R10.30 LOWER ABDOMINAL PAIN: ICD-10-CM

## 2024-10-14 DIAGNOSIS — I10 PRIMARY HYPERTENSION: ICD-10-CM

## 2024-10-14 DIAGNOSIS — R14.0 BLOATING: Primary | ICD-10-CM

## 2024-10-14 RX ORDER — POLYETHYLENE GLYCOL 3350, SODIUM CHLORIDE, SODIUM BICARBONATE, POTASSIUM CHLORIDE 420; 11.2; 5.72; 1.48 G/4L; G/4L; G/4L; G/4L
POWDER, FOR SOLUTION ORAL
Qty: 4000 ML | Refills: 0 | Status: SHIPPED | OUTPATIENT
Start: 2024-10-14

## 2024-10-14 NOTE — TELEPHONE ENCOUNTER
Scheduled for:  Colonoscopy 11207,EGD 68205  Provider Name:  Dr. Ruvalcaba  Date:  2/17/2025  Location:[facility]  Sedation:  MAC  Time:  (pt is aware that ENDO will call the day before to confirm arrival time)    Prep:  Trilyte  Meds/Allergies Reconciled?:  LEXII Vargas Reviewed  Diagnosis with codes:     Iron deficiency anemia D50.9  B12 Deficiency  E53.8  Bloating R14.0  Lower Abdominal Pain   R10.30  Was patient informed to call insurance with codes (Y/N):  Yes  Referral sent?:  Referral was sent at the time of electronic surgical scheduling.  EM or [facility] notified?:  I sent an electronic request to Endo Scheduling and received a confirmation today.  Medication Orders:  Pt is aware to NOT take iron pills, herbal meds and diet supplements for 7 days before exam. Also to NOT take any form of alcohol, recreational drugs and any forms of ED meds 24 hours before exam.   Misc Orders:       Further instructions given by staff:  I provide prep instructions to patient at the time of the appointment and reviewed date, time and location, she verbalized that she understood and is aware to call if she has any questions.

## 2024-10-14 NOTE — PATIENT INSTRUCTIONS
-repeat labs per pcp  -complete ct calcium scoring  -x-ray   -hpylori testing    1. Schedule colonoscopy/egd with MAC w/ General pool MD [Diagnosis:#darvin  #b12 def  #bloating  #low abd pain ]    2.  bowel prep from pharmacy (split trilyte )    3.   For cardiology patients and patients on blood thinners:  Please contact your cardiology clinic for clearance to proceed with the endoscopic procedure. If you are on blood thinners, please also confirm with your cardiologic clinic that you are able to hold the blood thinner per our recommendations.\"    BLOOD THINNER ORDERS:  -Hold for 48 hours (Xarelto, Eliquis, Pradaxa, Savaysa)  -Hold for 3 days (Pletal)  -Hold for 5 days (Coumadin, Plavix, Brilinta, Aggrenox)  -Hold for 7 days (Effient)     For endocrinology insulin patients:    Please contact your endocrinology clinic for insulin adjustment orders prior to your endoscopic procedure.    4. Read all bowel prep instructions carefully    5. AVOID seeds, nuts, popcorn, raw fruits and vegetables (cooked is okay) for 2-3 days before procedure    6.  You MAY need to go for COVID testing 72 hours before procedure. The testing team will call you a few days before your procedure to discuss with you if testing is required. If you are asked to go for COVID testing and do not completed the test, the procedure cannot be performed.     7. If you start any NEW medication after your visit today, please notify us. Certain medications will need to be held before the procedure, or the procedure cannot be performed.     >>>Please note: if you were prescribed Suprep for the bowel prep and it is too expensive or not covered by insurance, it is okay to substitute Trilyte (or any similar generic prep). This can be done by notifying the pharmacy or calling our office.

## 2024-11-15 ENCOUNTER — TELEPHONE (OUTPATIENT)
Dept: GASTROENTEROLOGY | Facility: CLINIC | Age: 44
End: 2024-11-15

## 2024-11-15 NOTE — TELEPHONE ENCOUNTER
1st reminder mailed out to patient about:    Helicobacter Pylori Breath Test, Adult  XR ABDOMEN (1 VIEW) (CPT=74018)  
groin/buttock

## 2024-11-16 ENCOUNTER — LAB ENCOUNTER (OUTPATIENT)
Dept: LAB | Facility: HOSPITAL | Age: 44
End: 2024-11-16
Attending: NURSE PRACTITIONER
Payer: COMMERCIAL

## 2024-11-16 ENCOUNTER — HOSPITAL ENCOUNTER (OUTPATIENT)
Dept: GENERAL RADIOLOGY | Facility: HOSPITAL | Age: 44
Discharge: HOME | End: 2024-11-16
Attending: NURSE PRACTITIONER
Payer: COMMERCIAL

## 2024-11-16 DIAGNOSIS — E53.8 VITAMIN B12 DEFICIENCY: ICD-10-CM

## 2024-11-16 DIAGNOSIS — R10.30 LOWER ABDOMINAL PAIN: ICD-10-CM

## 2024-11-16 DIAGNOSIS — Z13.6 SCREENING FOR HEART DISEASE: ICD-10-CM

## 2024-11-16 DIAGNOSIS — E66.01 MORBID OBESITY (HCC): ICD-10-CM

## 2024-11-16 DIAGNOSIS — Z00.00 ANNUAL PHYSICAL EXAM: ICD-10-CM

## 2024-11-16 DIAGNOSIS — D50.9 IRON DEFICIENCY ANEMIA, UNSPECIFIED IRON DEFICIENCY ANEMIA TYPE: ICD-10-CM

## 2024-11-16 DIAGNOSIS — E53.8 B12 DEFICIENCY: ICD-10-CM

## 2024-11-16 DIAGNOSIS — I10 ESSENTIAL HYPERTENSION: ICD-10-CM

## 2024-11-16 DIAGNOSIS — R14.0 BLOATING: ICD-10-CM

## 2024-11-16 DIAGNOSIS — Z12.31 VISIT FOR SCREENING MAMMOGRAM: ICD-10-CM

## 2024-11-16 DIAGNOSIS — R06.83 SNORING: ICD-10-CM

## 2024-11-16 LAB
ALBUMIN SERPL-MCNC: 4.3 G/DL (ref 3.2–4.8)
ALBUMIN/GLOB SERPL: 1.3 {RATIO} (ref 1–2)
ALP LIVER SERPL-CCNC: 103 U/L
ALT SERPL-CCNC: 20 U/L
ANION GAP SERPL CALC-SCNC: 4 MMOL/L (ref 0–18)
AST SERPL-CCNC: 14 U/L (ref ?–34)
BASOPHILS # BLD AUTO: 0.02 X10(3) UL (ref 0–0.2)
BASOPHILS NFR BLD AUTO: 0.3 %
BILIRUB SERPL-MCNC: 0.6 MG/DL (ref 0.3–1.2)
BUN BLD-MCNC: 13 MG/DL (ref 9–23)
BUN/CREAT SERPL: 14.4 (ref 10–20)
CALCIUM BLD-MCNC: 9.5 MG/DL (ref 8.7–10.4)
CHLORIDE SERPL-SCNC: 107 MMOL/L (ref 98–112)
CHOLEST SERPL-MCNC: 171 MG/DL (ref ?–200)
CO2 SERPL-SCNC: 30 MMOL/L (ref 21–32)
CREAT BLD-MCNC: 0.9 MG/DL
DEPRECATED HBV CORE AB SER IA-ACNC: 74 NG/ML
DEPRECATED RDW RBC AUTO: 48.1 FL (ref 35.1–46.3)
EGFRCR SERPLBLD CKD-EPI 2021: 81 ML/MIN/1.73M2 (ref 60–?)
EOSINOPHIL # BLD AUTO: 0.15 X10(3) UL (ref 0–0.7)
EOSINOPHIL NFR BLD AUTO: 2 %
ERYTHROCYTE [DISTWIDTH] IN BLOOD BY AUTOMATED COUNT: 14.1 % (ref 11–15)
EST. AVERAGE GLUCOSE BLD GHB EST-MCNC: 126 MG/DL (ref 68–126)
FASTING PATIENT LIPID ANSWER: YES
FASTING STATUS PATIENT QL REPORTED: YES
GLOBULIN PLAS-MCNC: 3.2 G/DL (ref 2–3.5)
GLUCOSE BLD-MCNC: 92 MG/DL (ref 70–99)
HBA1C MFR BLD: 6 % (ref ?–5.7)
HCT VFR BLD AUTO: 37.4 %
HDLC SERPL-MCNC: 47 MG/DL (ref 40–59)
HGB BLD-MCNC: 12 G/DL
IMM GRANULOCYTES # BLD AUTO: 0.03 X10(3) UL (ref 0–1)
IMM GRANULOCYTES NFR BLD: 0.4 %
IRON SATN MFR SERPL: 12 %
IRON SERPL-MCNC: 38 UG/DL
LDLC SERPL CALC-MCNC: 114 MG/DL (ref ?–100)
LYMPHOCYTES # BLD AUTO: 2.36 X10(3) UL (ref 1–4)
LYMPHOCYTES NFR BLD AUTO: 31.3 %
MCH RBC QN AUTO: 29.6 PG (ref 26–34)
MCHC RBC AUTO-ENTMCNC: 32.1 G/DL (ref 31–37)
MCV RBC AUTO: 92.1 FL
MONOCYTES # BLD AUTO: 0.49 X10(3) UL (ref 0.1–1)
MONOCYTES NFR BLD AUTO: 6.5 %
NEUTROPHILS # BLD AUTO: 4.5 X10 (3) UL (ref 1.5–7.7)
NEUTROPHILS # BLD AUTO: 4.5 X10(3) UL (ref 1.5–7.7)
NEUTROPHILS NFR BLD AUTO: 59.5 %
NONHDLC SERPL-MCNC: 124 MG/DL (ref ?–130)
OSMOLALITY SERPL CALC.SUM OF ELEC: 292 MOSM/KG (ref 275–295)
PLATELET # BLD AUTO: 350 10(3)UL (ref 150–450)
POTASSIUM SERPL-SCNC: 3.6 MMOL/L (ref 3.5–5.1)
PROT SERPL-MCNC: 7.5 G/DL (ref 5.7–8.2)
RBC # BLD AUTO: 4.06 X10(6)UL
SODIUM SERPL-SCNC: 141 MMOL/L (ref 136–145)
TIBC SERPL-MCNC: 322 UG/DL (ref 250–425)
TRANSFERRIN SERPL-MCNC: 216 MG/DL (ref 250–380)
TRIGL SERPL-MCNC: 49 MG/DL (ref 30–149)
TSI SER-ACNC: 0.92 UIU/ML (ref 0.55–4.78)
VIT B12 SERPL-MCNC: 258 PG/ML (ref 211–911)
VIT D+METAB SERPL-MCNC: 28.5 NG/ML (ref 30–100)
VLDLC SERPL CALC-MCNC: 8 MG/DL (ref 0–30)
WBC # BLD AUTO: 7.6 X10(3) UL (ref 4–11)

## 2024-11-16 PROCEDURE — 82607 VITAMIN B-12: CPT | Performed by: INTERNAL MEDICINE

## 2024-11-16 PROCEDURE — 83540 ASSAY OF IRON: CPT | Performed by: INTERNAL MEDICINE

## 2024-11-16 PROCEDURE — 74018 RADEX ABDOMEN 1 VIEW: CPT | Performed by: NURSE PRACTITIONER

## 2024-11-16 PROCEDURE — 80061 LIPID PANEL: CPT | Performed by: INTERNAL MEDICINE

## 2024-11-16 PROCEDURE — 84466 ASSAY OF TRANSFERRIN: CPT | Performed by: INTERNAL MEDICINE

## 2024-11-16 PROCEDURE — 83036 HEMOGLOBIN GLYCOSYLATED A1C: CPT | Performed by: INTERNAL MEDICINE

## 2024-11-16 PROCEDURE — 82306 VITAMIN D 25 HYDROXY: CPT | Performed by: INTERNAL MEDICINE

## 2024-11-16 PROCEDURE — 82728 ASSAY OF FERRITIN: CPT | Performed by: INTERNAL MEDICINE

## 2024-11-16 PROCEDURE — 80050 GENERAL HEALTH PANEL: CPT | Performed by: INTERNAL MEDICINE

## 2024-11-17 LAB — H PYLORI BREATH TEST: NEGATIVE

## 2024-11-19 DIAGNOSIS — E61.1 IRON DEFICIENCY: Primary | ICD-10-CM

## 2024-11-19 RX ORDER — FERROUS SULFATE 324(65)MG
1 TABLET, DELAYED RELEASE (ENTERIC COATED) ORAL 2 TIMES DAILY
Qty: 180 TABLET | Refills: 1 | Status: SHIPPED | OUTPATIENT
Start: 2024-11-19 | End: 2025-02-17

## 2024-12-04 ENCOUNTER — HOSPITAL ENCOUNTER (OUTPATIENT)
Dept: MAMMOGRAPHY | Age: 44
Discharge: HOME | End: 2024-12-04
Attending: INTERNAL MEDICINE
Payer: COMMERCIAL

## 2024-12-04 DIAGNOSIS — E66.01 MORBID OBESITY (HCC): ICD-10-CM

## 2024-12-04 DIAGNOSIS — I10 ESSENTIAL HYPERTENSION: ICD-10-CM

## 2024-12-04 DIAGNOSIS — Z13.6 SCREENING FOR HEART DISEASE: ICD-10-CM

## 2024-12-04 DIAGNOSIS — E53.8 VITAMIN B12 DEFICIENCY: ICD-10-CM

## 2024-12-04 DIAGNOSIS — R06.83 SNORING: ICD-10-CM

## 2024-12-04 DIAGNOSIS — Z12.31 VISIT FOR SCREENING MAMMOGRAM: ICD-10-CM

## 2024-12-04 DIAGNOSIS — Z00.00 ANNUAL PHYSICAL EXAM: ICD-10-CM

## 2024-12-04 PROCEDURE — 77063 BREAST TOMOSYNTHESIS BI: CPT | Performed by: INTERNAL MEDICINE

## 2024-12-04 PROCEDURE — 77067 SCR MAMMO BI INCL CAD: CPT | Performed by: INTERNAL MEDICINE

## 2024-12-10 DIAGNOSIS — I10 ESSENTIAL HYPERTENSION: ICD-10-CM

## 2024-12-10 RX ORDER — LOSARTAN POTASSIUM AND HYDROCHLOROTHIAZIDE 12.5; 5 MG/1; MG/1
1 TABLET ORAL DAILY
Qty: 90 TABLET | Refills: 3 | Status: SHIPPED | OUTPATIENT
Start: 2024-12-10

## 2025-03-03 ENCOUNTER — TELEPHONE (OUTPATIENT)
Facility: CLINIC | Age: 45
End: 2025-03-03

## 2025-03-03 DIAGNOSIS — R14.0 ABDOMINAL DISTENSION: ICD-10-CM

## 2025-03-03 DIAGNOSIS — E53.8 VITAMIN B12 DEFICIENCY: ICD-10-CM

## 2025-03-03 DIAGNOSIS — E66.01 MORBID OBESITY (HCC): ICD-10-CM

## 2025-03-03 DIAGNOSIS — Z97.5 IUD (INTRAUTERINE DEVICE) IN PLACE: ICD-10-CM

## 2025-03-03 DIAGNOSIS — D64.9 ANEMIA, UNSPECIFIED TYPE: ICD-10-CM

## 2025-03-03 DIAGNOSIS — F32.2 MODERATELY SEVERE MAJOR DEPRESSION (HCC): ICD-10-CM

## 2025-03-03 DIAGNOSIS — Z12.4 SCREENING FOR CERVICAL CANCER: ICD-10-CM

## 2025-03-03 DIAGNOSIS — R14.0 ABDOMINAL BLOATING: ICD-10-CM

## 2025-03-03 DIAGNOSIS — Z00.00 ANNUAL PHYSICAL EXAM: ICD-10-CM

## 2025-03-03 RX ORDER — ERGOCALCIFEROL 1.25 MG/1
50000 CAPSULE, LIQUID FILLED ORAL WEEKLY
Qty: 12 CAPSULE | Refills: 1 | Status: SHIPPED | OUTPATIENT
Start: 2025-03-03

## 2025-03-03 NOTE — TELEPHONE ENCOUNTER
Patient is requesting to reschedule 3/10/25 Colonoscopy and Esophagogastroduodenoscopy.  Please call

## 2025-03-03 NOTE — TELEPHONE ENCOUNTER
Left message for patient to call back to schedule Colonoscopy/Esophagogastroduodenoscopy     Please transfer call to GI surgery scheduling

## 2025-03-05 NOTE — TELEPHONE ENCOUNTER
2nd attempt     Left message for patient advising appointment has been cancelled  and to call back to reschedule Colonoscopy/Esophagogastroduodenoscopy.    Please transfer call to GI surgery scheduling    Cancelled    See 10/14 telephone encounter

## 2025-03-11 ENCOUNTER — PATIENT MESSAGE (OUTPATIENT)
Dept: INTERNAL MEDICINE CLINIC | Facility: CLINIC | Age: 45
End: 2025-03-11

## 2025-03-11 ENCOUNTER — OFFICE VISIT (OUTPATIENT)
Dept: INTERNAL MEDICINE CLINIC | Facility: CLINIC | Age: 45
End: 2025-03-11
Payer: COMMERCIAL

## 2025-03-11 VITALS
WEIGHT: 293 LBS | HEART RATE: 78 BPM | SYSTOLIC BLOOD PRESSURE: 142 MMHG | OXYGEN SATURATION: 93 % | DIASTOLIC BLOOD PRESSURE: 98 MMHG | BODY MASS INDEX: 48.82 KG/M2 | HEIGHT: 65 IN

## 2025-03-11 DIAGNOSIS — R73.03 PREDIABETES: ICD-10-CM

## 2025-03-11 DIAGNOSIS — F32.A ANXIETY AND DEPRESSION: ICD-10-CM

## 2025-03-11 DIAGNOSIS — E66.01 MORBID OBESITY (HCC): ICD-10-CM

## 2025-03-11 DIAGNOSIS — E66.01 MORBID OBESITY (HCC): Primary | ICD-10-CM

## 2025-03-11 DIAGNOSIS — I10 ESSENTIAL HYPERTENSION: Primary | ICD-10-CM

## 2025-03-11 DIAGNOSIS — F41.9 ANXIETY AND DEPRESSION: ICD-10-CM

## 2025-03-11 PROCEDURE — 3080F DIAST BP >= 90 MM HG: CPT | Performed by: INTERNAL MEDICINE

## 2025-03-11 PROCEDURE — 3077F SYST BP >= 140 MM HG: CPT | Performed by: INTERNAL MEDICINE

## 2025-03-11 PROCEDURE — 99215 OFFICE O/P EST HI 40 MIN: CPT | Performed by: INTERNAL MEDICINE

## 2025-03-11 PROCEDURE — 3008F BODY MASS INDEX DOCD: CPT | Performed by: INTERNAL MEDICINE

## 2025-03-11 RX ORDER — FLUOXETINE 10 MG/1
10 CAPSULE ORAL DAILY
Qty: 90 CAPSULE | Refills: 0 | Status: SHIPPED | OUTPATIENT
Start: 2025-03-11 | End: 2025-06-09

## 2025-03-11 RX ORDER — LOSARTAN POTASSIUM AND HYDROCHLOROTHIAZIDE 12.5; 1 MG/1; MG/1
1 TABLET ORAL DAILY
Qty: 90 TABLET | Refills: 1 | Status: SHIPPED | OUTPATIENT
Start: 2025-03-11 | End: 2025-06-09

## 2025-03-11 NOTE — PATIENT INSTRUCTIONS
BENJAMIN Paulacome to [facility]. We are excited that you are committed to improving your health and have invited our practice to be part of your journey. Our approach to the medical management of weight loss is similar to that of other chronic diseases, like asthma or high blood pressure. Treatment is tailored to your needs and may look different than someone else in our program. Weight-loss success is dependent on many factors, including your motivation and commitment to better health.      We are committed to your medical safety, particularly when prescribing weight-loss medications. Because we are physicians, we measure your success not only by “pounds lost” - we will also track improvements in your laboratory work, vital signs and quality of life.      Weight loss and maintenance can be a challenging endeavor. We want to celebrate your successes and support you if you encounter difficult times. Please don’t hesitate to ask us questions and share with us any struggles you may have. For some patients, there may be many attempts at weight loss before lasting success is found, but we can help you find your success!      Sincerely,     Mariajose Thacker MD  American Board of Obesity Medicine Diplomate  American Board of Internal Medicine Diplomate                                                                  Weight Loss Tips    Cut down on sugar and starches.    Ok to eat healthy fat ( avocado, nuts,eggs, olive oil and coconut oil)    Eat protein with each meal target 15-30 G of protein with each meal: Protein reduces appetite, cravings and hunger. It increases muscle mass and subsequently metabolism and fat burning.     If not able to meet your protein need, you can get a protein shake. Choose one with around 25 g of protein and low carb less than 5 g ( e.g: premier protein, orgain Clean Protein, whey protein muscle milk)    Limit carbohydrates between 50g-100g / day    Limit processed food, eat  unprocessed food whenever you can.    Read nutrition labels    Drink a lot of water  at least 65 oz of water per day: Water is a natural appetite suppressant, increases calorie burning and help you to loose weight.    Eat slowly.    Do not drink your calories ( avoid soft drinks, juice, high calorie coffee drinks, limit alcohol) Also stay away from artificially sweetened beverages too it can cause weight gain.    Think about challenges and write it down to address it next visit.     Do not eat late at night.      Exercise goal for weight loss is 150 minutes per week.    Take a probiotic everyday ( e.g: moon colon health brand, culturelle, VSL3, Lactobacillus Gasseri )     Use smart phone applications to track your progress/ carb intake e.g:( my fitnesspal, loose it, Carb Manager )    Have a good night sleep aim for 7-8 hours    Reduce your stress level.    Helpful websites: www.dietDownrange Enterprises.Piczo( search visual low-carb guides dietdoctor), or www.[employer].Piczo.    Helpful exercise apps( [employer] gloria)   Helpful fasting apps :( Zero)         Foods to avoid :  Sugar: sweets, ice cream, fruit juices, candy and food that is high in added sugar.  Highly processed food  Refined grains: Rice, wheat, bread, cereal and pasta.  Starchy vegetables: Potatoes and corn     Low Carb food :  Meat: lamb, steak, chicken, turkey  Fish and sea food   Eggs  Plain yogurt   Cheese   Fat and oils   Fruits: berries are the best           How I Plan to Lose Weight      Goal setting is the “how” of weight loss. Motivators are the “why.” When setting goals, utilize the SMART technique:    SMART Technique Example   Specific Who, what, where, when, how… “I want to lose 10 pounds in two months.”   Measureable How will you track? 10 pounds in 8 weeks = 1.25 pounds/week   Attainable Resources you have available, previous experience “I have been able to do this before, and now I have new tools from my doctor!”   Relevant Why this goal is important  Review your motivators above   Timely Set benchmarks and deadlines “Focusing for two month intervals works for me.”         Even if your lose 0.5 pound per week You will still lose 26                       pounds by this time next year!

## 2025-03-11 NOTE — PROGRESS NOTES
Payam Kat is a 44 year old female.  Chief complaint:  weight loss management and obesity related comorbidities    HPI:     Payam Kat is a 44 year old female new to our office today.   with PMH as listed below here for weight management     Weight history:    Wt Readings from Last 12 Encounters:   03/11/25 295 lb 9.6 oz (134.1 kg)   10/14/24 (!) 301 lb 8 oz (136.8 kg)   10/10/24 298 lb (135.2 kg)   09/05/24 296 lb 12.8 oz (134.6 kg)   08/01/24 (!) 301 lb (136.5 kg)   06/12/23 293 lb (132.9 kg)   06/07/22 288 lb (130.6 kg)   02/22/21 288 lb (130.6 kg)   02/17/21 300 lb (136.1 kg)   12/19/18 290 lb (131.5 kg)   09/19/18 290 lb (131.5 kg)   08/20/18 296 lb 6.4 oz (134.4 kg)     Weight:  Max weight:301 lbs     Lowest weight:before she had kids   Average weight around 200lbs   Triggers for weight gain? Stress eating and craving   Not enough physical activity       Previous weight loss programs? YES:  Weight Watchers      Previous weight loss medications?  Years ago   Do you get up to eat at night? No       Typical diet   Breakfast: Lunch: Dinner: Snacks:   Skips breakfast   Or oatmeal bar   Fruit    Salad   Or whatever the entry is   Protein and vegetable    Either something frozen   Take out   Eating late      Potato chips      Eating out 3-4 times per week   She lives with he 2 children   Sometimes she cooks at home     Sweet tooth: Yes  Soda or Juice: Yes: soda and juice   Once daily at least     Activity: no     Sleep: 6 hours of sleep     Stress: 10/10   She is not using any lexapro       Significant PMH/ or weight Related Co morbidities : HTN, prediabetes   History of thyroid disease: No  History of thyroid nodule: No  Family history of thyroid cancer: No  History of pancreatitis : No  History of kidney stone: No   History of glaucoma:  suspected   She is supposed to go back     History of heart disease: No  Seizure: No        Current Outpatient Medications   Medication Sig Dispense Refill     ERGOCALCIFEROL 1.25 MG (62867 UT) Oral Cap TAKE 1 CAPSULE BY MOUTH 1 TIME A WEEK FOR 12 DOSES 12 capsule 1    losartan-hydroCHLOROthiazide 50-12.5 MG Oral Tab Take 1 tablet by mouth daily. 90 tablet 3    Cyanocobalamin 500 MCG Oral Chew Tab Chew 1 tablet by mouth daily. 90 tablet 1    escitalopram (LEXAPRO) 10 MG Oral Tab Take 1 tablet (10 mg total) by mouth every morning. 30 tablet 1    clonazePAM (KLONOPIN) 0.5 MG Oral Tab Take 1 tablet (0.5 mg total) by mouth 2 (two) times daily. 60 tablet 0    PEG 3350-KCl-Na Bicarb-NaCl (TRILYTE) 420 g Oral Recon Soln Take prep as directed by gastro office. May substitute with Trilyte/generic equivalent if needed. 4000 mL 0    clotrimazole-betamethasone 1-0.05 % External Cream Apply 1 Application topically 2 (two) times daily as needed. 15 g 3       Past Medical History:    Allergic rhinitis    Anemia    Anxiety    Depression    Essential hypertension    Personal history of sepsis    complicated by varicella, had had tracheostomy       Past Surgical History:   Procedure Laterality Date            2004    Emergency tracheotomy  2008    Tonsillectomy  1990       Patient Active Problem List   Diagnosis    Elevated BP    Mild single current episode of major depressive disorder    Personal history of sepsis    Essential hypertension    IUD (intrauterine device) in place    Morbid obesity, unspecified obesity type (HCC)    Poor concentration    Screening for HPV (human papillomavirus)    Screening mammogram, encounter for    Anxiety and depression    Morbid obesity (HCC)    Nail thickening    Snoring    Prediabetes    Keloid scar of skin    Hypersensitive carotid sinus syndrome    Vitamin B12 deficiency    Anemia    Abdominal distension    Moderately severe major depression (HCC)               REVIEW OF SYSTEMS:   A comprehensive 10 point review of systems was completed.  Pertinent positives and negatives noted in the HPI      PHYSICAL EXAM:   BP (!) 148/98    Pulse 78   Ht 5' 5\" (1.651 m)   Wt 295 lb 9.6 oz (134.1 kg)   SpO2 93%   BMI 49.19 kg/m²   [unfilled]    Wt Readings from Last 6 Encounters:   03/11/25 295 lb 9.6 oz (134.1 kg)   10/14/24 (!) 301 lb 8 oz (136.8 kg)   10/10/24 298 lb (135.2 kg)   09/05/24 296 lb 12.8 oz (134.6 kg)   08/01/24 (!) 301 lb (136.5 kg)   06/12/23 293 lb (132.9 kg)        GENERAL: well developed, well nourished,in no apparent distress  NECK: supple,no adenopathy  LUNGS: clear to auscultation  CARDIO: RRR without murmur  NEURO: no gross deficits     No orders of the defined types were placed in this encounter.    No orders of the defined types were placed in this encounter.      ASSESSMENT/PLAN:       ICD-10-CM    1. Essential hypertension  I10 FLUoxetine 10 MG Oral Cap     Losartan Potassium-HCTZ 100-12.5 MG Oral Tab      2. Prediabetes  R73.03 FLUoxetine 10 MG Oral Cap     Losartan Potassium-HCTZ 100-12.5 MG Oral Tab      3. Morbid obesity (HCC)  E66.01 FLUoxetine 10 MG Oral Cap     Losartan Potassium-HCTZ 100-12.5 MG Oral Tab      4. Anxiety and depression  F41.9 FLUoxetine 10 MG Oral Cap    F32.A Losartan Potassium-HCTZ 100-12.5 MG Oral Tab           Reviewed  Readiness for Lifestyle change: 7/10, Interest in Medication: 10/10, Surgery interest: 5/10.    Counseled on comprehensive weight loss plan including attention to nutrition, exercise and behavior/stress management for success.     Plan:  Advised the patient to follow low carb high protein diet   Advised to count carbs goal carbs is 50 g per day   Advised to increase protein goal is 90 g per day   Can add protein shakes   Increase water intake goal is 64 oz per day   Start probiotics   Increase exercise cardio and weight resistance training, discussed the importance of exercise for weight loss goal is 150 min per week   don't eat at late night   Given meal plan   Discussed resources for low carb meal   Discussed the importance of sleep and reducing stress for weight  loss  Given low carb meal plan   Goal weight loss is 14 lbs in 3 months   Advised to increase losartan hydrochlorothiazide   Advised to check with insurance if they cover zepbound or wegovy   Cannot use phentermine or contrave due to uncontrolled BP   Recheck blood work   Starting prozac for anxiety and depression       Discussed:  -low carb high protein  -Limit carbohydrates  -Read nutrition labels and keep a food log  -drink a lot of water 65 oz of water per day  - Do not drink your calories (no regular pop, juice, high calorie coffee drinks, limit alcohol)  - Do not eat late at night  - Exercise- at least 3 times per week ( goal is 150 min/week)  -dietician referral: Yes  -Labs as ordered: Yes        Follow up with PCP as scheduled.   Follow up with Mariajose Thacker 1 mos      I spent 40 minutes at the day of the service seeing the patient, examination, reviewing labs, independently interpreting results, completing charting and counseling the patient and/or on coordination of care.  The diagnosis, prognosis, and general treatment was explained to the patient.   Please return to the clinic if you are having persistent or worsening symptoms   Mariajose Thacker MD,   Diplomate of the American Board of Internal Medicine  Diplomate of the American Board of Obesity Medicine

## 2025-03-12 RX ORDER — TIRZEPATIDE 5 MG/.5ML
5 INJECTION, SOLUTION SUBCUTANEOUS WEEKLY
Qty: 2 ML | Refills: 0 | Status: SHIPPED | OUTPATIENT
Start: 2025-03-12 | End: 2025-04-03

## 2025-03-12 RX ORDER — TIRZEPATIDE 2.5 MG/.5ML
2.5 INJECTION, SOLUTION SUBCUTANEOUS WEEKLY
Qty: 2 ML | Refills: 0 | Status: SHIPPED | OUTPATIENT
Start: 2025-03-12 | End: 2025-04-03

## 2025-03-13 NOTE — TELEPHONE ENCOUNTER
This is the third attempt to reach this pt in regards to scheduling with NO success.    Letter sent.    TE closed.

## 2025-03-14 ENCOUNTER — TELEPHONE (OUTPATIENT)
Dept: INTERNAL MEDICINE CLINIC | Facility: CLINIC | Age: 45
End: 2025-03-14

## 2025-03-17 NOTE — TELEPHONE ENCOUNTER
Received fax from [employer] requesting additional information for approval on Zepbound 2.5mg.     Faxed office notes.     NICOLAS ARAGON

## 2025-03-25 ENCOUNTER — HOSPITAL ENCOUNTER (OUTPATIENT)
Dept: CT IMAGING | Age: 45
Discharge: HOME | End: 2025-03-25
Attending: INTERNAL MEDICINE

## 2025-03-25 VITALS — BODY MASS INDEX: 48.82 KG/M2 | WEIGHT: 293 LBS | HEIGHT: 65 IN

## 2025-03-25 DIAGNOSIS — Z13.6 SCREENING FOR HEART DISEASE: ICD-10-CM

## 2025-03-25 DIAGNOSIS — I10 ESSENTIAL HYPERTENSION: ICD-10-CM

## 2025-03-25 DIAGNOSIS — E66.01 MORBID OBESITY (HCC): ICD-10-CM

## 2025-03-25 DIAGNOSIS — Z12.31 VISIT FOR SCREENING MAMMOGRAM: ICD-10-CM

## 2025-03-25 DIAGNOSIS — E53.8 VITAMIN B12 DEFICIENCY: ICD-10-CM

## 2025-03-25 DIAGNOSIS — R06.83 SNORING: ICD-10-CM

## 2025-03-25 DIAGNOSIS — Z00.00 ANNUAL PHYSICAL EXAM: ICD-10-CM

## 2025-03-25 LAB
POCT GLUCOSE CHOLESTECH: 91 (ref 70–99)
POCT HDL: 57 (ref 40–60)
POCT LDL: 132 (ref 0–99)
POCT TOTAL CHOLESTEROL: 201 (ref 110–200)
POCT TRIGLYCERIDES: 56 (ref 1–149)

## 2025-03-25 NOTE — PROGRESS NOTES
Date of Service 3/25/2025    BENJAMIN HARRIS  Date of Birth 5/1/1980    Patient Age: 44 year old    PCP: Mariajose Thacker MD  [address] [place] [id number]    Heart Scan Consult  Preliminary Heart Scan Score: 0    Previous Screening  Heart Scan Completed Previously: No        Peripheral Vascular Scan Completed Previously: No          Risk Factors  Personal Risk Factors  Non-alterable Risk Factors: Personal History, Family History (Father had stroke)  Alterable Risk Factors: High Blood Pressure, Abnormal Cholesterol, Stress, Obesity, Unhealthy eating      Body Mass Index  Body mass index is 49.09 kg/m².    Blood Pressure  Blood Pressure measurement declined during this encounter.  (Normal =< 120/80,  Elevated = 120-129/ >80,  High Stage1 130-139/80-89 , Stage2 >140/>90)    Lipid Profile  Patient was in fasting state: Yes    Cholesterol: 201, done on 3/25/2025.  HDL Cholesterol: 57, done on 3/25/2025.  LDL Cholesterol: 132, done on 3/25/2025.  TriGlycerides 56, done on 3/25/2025.    Cholesterol Goals  Value   Total  =< 200   HDL  = > 45 Men = > 55 Women   LDL   =< 100   Triglycerides  =< 150       Glucose and Hemoglobin A1C  Lab Results   Component Value Date    PGLU 91 03/25/2025    GLU 92 11/16/2024    A1C 6.0 (H) 11/16/2024     (Normal Fasting Glucose < 100mg/dl )    Nurse Review  Risk factor information and results reviewed with Nurse: Yes    Recommended Follow Up:  Consult your physician regarding:: Final Heart Scan Report, Discuss potential for Incidental Finding, Discuss Potential for Score Variance      Recommendations for Change:  Nutrition Changes: Low Saturated Fat, Low Salt Eating, Increase Fiber (Reports eating mostly chicken and turkey, occasional lean beef. Diet is heavy in carbs.)    Cholesterol Modification (goal of therapy depends upon your risk): Decrease LDL (Lousy/Bad) Ideal <100, Decrease Total Normal <200    Exercise: Initiate Program (Discussed setting atttainable goals for exercise  to reach 150 minutes per week)    Smoking Cessation: No Change Needed    Weight Management: Decrease Current Weight (Hopes to start zepbound soon,)    Stress Management: Adopt Stress Management Techniques    Repeat Heart Scan: 5 years if Calcium Score is 0.0, Discuss with your Physician       FINAL HEART SCAN REPORT MAY TAKE 2 WEEKS.       Ariela Recommended Resources:  Recommended Resources: Upcoming Classes, Medical Services and Health Library [email or web address], [email or web address] [phone], [facility] [phone] ([facility]), [facility] [phone] (EDW)            Shelia STARKS RN        Please Contact the Nurse Heart Line with any Questions or Concerns [phone].

## 2025-05-24 DIAGNOSIS — E66.01 MORBID OBESITY (HCC): ICD-10-CM

## 2025-05-29 RX ORDER — TIRZEPATIDE 5 MG/.5ML
5 INJECTION, SOLUTION SUBCUTANEOUS WEEKLY
Qty: 6 ML | Refills: 0 | Status: SHIPPED | OUTPATIENT
Start: 2025-05-29 | End: 2027-02-12

## 2025-06-16 ENCOUNTER — OFFICE VISIT (OUTPATIENT)
Dept: INTERNAL MEDICINE CLINIC | Facility: CLINIC | Age: 45
End: 2025-06-16
Payer: COMMERCIAL

## 2025-06-16 VITALS
WEIGHT: 285.81 LBS | HEIGHT: 65 IN | OXYGEN SATURATION: 98 % | HEART RATE: 69 BPM | SYSTOLIC BLOOD PRESSURE: 132 MMHG | DIASTOLIC BLOOD PRESSURE: 88 MMHG | BODY MASS INDEX: 47.62 KG/M2

## 2025-06-16 DIAGNOSIS — F41.9 ANXIETY AND DEPRESSION: ICD-10-CM

## 2025-06-16 DIAGNOSIS — F32.A ANXIETY AND DEPRESSION: ICD-10-CM

## 2025-06-16 DIAGNOSIS — E61.1 IRON DEFICIENCY: ICD-10-CM

## 2025-06-16 DIAGNOSIS — I10 ESSENTIAL HYPERTENSION: ICD-10-CM

## 2025-06-16 DIAGNOSIS — E66.01 MORBID OBESITY (HCC): ICD-10-CM

## 2025-06-16 DIAGNOSIS — Z51.81 THERAPEUTIC DRUG MONITORING: Primary | ICD-10-CM

## 2025-06-16 PROCEDURE — 3079F DIAST BP 80-89 MM HG: CPT | Performed by: INTERNAL MEDICINE

## 2025-06-16 PROCEDURE — 3075F SYST BP GE 130 - 139MM HG: CPT | Performed by: INTERNAL MEDICINE

## 2025-06-16 PROCEDURE — 3008F BODY MASS INDEX DOCD: CPT | Performed by: INTERNAL MEDICINE

## 2025-06-16 PROCEDURE — 99214 OFFICE O/P EST MOD 30 MIN: CPT | Performed by: INTERNAL MEDICINE

## 2025-06-16 RX ORDER — FLUOXETINE 10 MG/1
10 CAPSULE ORAL DAILY
Qty: 90 CAPSULE | Refills: 1 | Status: SHIPPED | OUTPATIENT
Start: 2025-06-16 | End: 2025-09-14

## 2025-06-16 RX ORDER — TIRZEPATIDE 7.5 MG/.5ML
7.5 INJECTION, SOLUTION SUBCUTANEOUS WEEKLY
Qty: 6 ML | Refills: 0 | Status: SHIPPED | OUTPATIENT
Start: 2025-06-16 | End: 2025-09-02

## 2025-06-16 NOTE — PROGRESS NOTES
Payam Kat is a 45 year old female.    Chief complaint:weight loss follow up , medication refill, therapeutic drug monitoring    HPI:   PAYAM here for follow up on weight loss   Wt Readings from Last 12 Encounters:   06/16/25 285 lb 12.8 oz (129.6 kg)   03/25/25 295 lb (133.8 kg)   03/11/25 295 lb 9.6 oz (134.1 kg)   10/14/24 (!) 301 lb 8 oz (136.8 kg)   10/10/24 298 lb (135.2 kg)   09/05/24 296 lb 12.8 oz (134.6 kg)   08/01/24 (!) 301 lb (136.5 kg)   06/12/23 293 lb (132.9 kg)   06/07/22 288 lb (130.6 kg)   02/22/21 288 lb (130.6 kg)   02/17/21 300 lb (136.1 kg)   12/19/18 290 lb (131.5 kg)     Starting weight: 295 lbs   Total weight loss: 10 lbs     Medication: zepbound 5 mg       Typical diet   Breakfast: Lunch: Dinner: Snacks:   Left over  Skips lunch    Sometimes spaghetti    Not a lot of snacking   Fruit      Doesn't count protein or carbs       Soda/ juice/ alcohol: soda and juice   She cut back on soda   Juice at least 10 oz per day     Water intake not enough     Exercise: no     Challenges: meal planning   Craving more than hunger    Side effect of medication: no     Score to self ( how well she is doing ):7/10       Current Medications[1]   Past Medical History[2]  Past Surgical History[3]     Social History:  Short Social Hx on File[4]   Family History[5]  Problem List[6]            REVIEW OF SYSTEMS:   A comprehensive 10 point review of systems was completed.  Pertinent positives and negatives noted in the the HPI          PHYSICAL EXAM:   /88   Pulse 69   Ht 5' 5\" (1.651 m)   Wt 285 lb 12.8 oz (129.6 kg)   SpO2 98%   BMI 47.56 kg/m²   GENERAL: well developed, well nourished,in no apparent distress  LUNGS: clear to auscultation  CARDIO: RRR without murmur  NEURO: no gross deficits              No orders of the defined types were placed in this encounter.        ASSESSMENT/PLAN:       ICD-10-CM    1. Therapeutic drug monitoring  Z51.81 Tirzepatide-Weight Management (ZEPBOUND) 7.5  MG/0.5ML Subcutaneous Solution Auto-injector     FLUoxetine 10 MG Oral Cap      2. Morbid obesity (HCC)  E66.01 Tirzepatide-Weight Management (ZEPBOUND) 7.5 MG/0.5ML Subcutaneous Solution Auto-injector     FLUoxetine 10 MG Oral Cap      3. Iron deficiency  E61.1 Tirzepatide-Weight Management (ZEPBOUND) 7.5 MG/0.5ML Subcutaneous Solution Auto-injector     FLUoxetine 10 MG Oral Cap      4. Essential hypertension  I10 Tirzepatide-Weight Management (ZEPBOUND) 7.5 MG/0.5ML Subcutaneous Solution Auto-injector     FLUoxetine 10 MG Oral Cap      5. Anxiety and depression  F41.9     F32.A          Plan:  Patient has lost 10 lbs # since LOV. Patient has lost a total weight loss of 10 lbs # since first weight loss consult.  Labs reviewed  Advised to continue with low carb diet   Goal is less than 100 g per day   Advised to read nutrition labels  Log in carbs if possible   Increase protein intake   exercise goal is 1 hour 3 times per week cardio and strength training   discussed challenges with weight loss   Weigh once a week at least   Avoid sugary drinks or artificially sweetened drinks  Water intake goal is 64 oz per day   Loss of 14 pounds in 3 months  Advised to increase Zepbound dose to 7.5 mg  Patient has not been taking her blood pressure medications and depression medication consistently since she is moving  Discussed the importance of taking blood pressure and depression medications regularly  Starting Prozac since does not lead to weight gain        Plan:  Nutrition: low carb diet   Referral RD/nutritionist : no  Behavior:  Motivational interviewing performed      Discussed strategies to overcome habits/challenges       Reviewed:  Nutrition and the importance of regular protein intake  Labs ordered:    no  Treatment plan     Please return to the clinic if you are having persistent or worsening symptoms   Mariajose Thacker MD,   Diplomate of the American Board of Internal Medicine  Diplomate of the American Board of  Obesity Medicine            [1]   Current Outpatient Medications   Medication Sig Dispense Refill    Tirzepatide-Weight Management (ZEPBOUND) 5 MG/0.5ML Subcutaneous Solution Auto-injector Inject 5 mg into the skin once a week for 90 doses. 6 mL 0    ERGOCALCIFEROL 1.25 MG (49482 UT) Oral Cap TAKE 1 CAPSULE BY MOUTH 1 TIME A WEEK FOR 12 DOSES (Patient not taking: Reported on 2025) 12 capsule 1    losartan-hydroCHLOROthiazide 50-12.5 MG Oral Tab Take 1 tablet by mouth daily. (Patient not taking: Reported on 2025) 90 tablet 3    Cyanocobalamin 500 MCG Oral Chew Tab Chew 1 tablet by mouth daily. (Patient not taking: Reported on 2025) 90 tablet 1    escitalopram (LEXAPRO) 10 MG Oral Tab Take 1 tablet (10 mg total) by mouth every morning. (Patient not taking: Reported on 2025) 30 tablet 1    clonazePAM (KLONOPIN) 0.5 MG Oral Tab Take 1 tablet (0.5 mg total) by mouth 2 (two) times daily. (Patient not taking: Reported on 2025) 60 tablet 0    PEG 3350-KCl-Na Bicarb-NaCl (TRILYTE) 420 g Oral Recon Soln Take prep as directed by gastro office. May substitute with Trilyte/generic equivalent if needed. (Patient not taking: Reported on 2025) 4000 mL 0    clotrimazole-betamethasone 1-0.05 % External Cream Apply 1 Application topically 2 (two) times daily as needed. (Patient not taking: Reported on 2025) 15 g 3   [2]   Past Medical History:   Allergic rhinitis    Anemia    Anxiety    Depression    Essential hypertension    Personal history of sepsis    complicated by varicella, had had tracheostomy   [3]   Past Surgical History:  Procedure Laterality Date            2004    Emergency tracheotomy      Tonsillectomy     [4]   Social History  Socioeconomic History    Marital status:    Tobacco Use    Smoking status: Never    Smokeless tobacco: Never    Tobacco comments:     N/A   Vaping Use    Vaping status: Never Used   Substance and Sexual Activity    Alcohol  use: No    Drug use: Never     Social Drivers of Health      Received from [facility]    Housing Stability   [5]   Family History  Problem Relation Age of Onset    Diabetes Father 70    Hypertension Father         70    Cancer Father         Possible diagnosis of cancer    Diabetes Mother 70    Diabetes Other     Diabetes Maternal Aunt    [6]   Patient Active Problem List  Diagnosis    Elevated BP    Mild single current episode of major depressive disorder    Personal history of sepsis    Essential hypertension    IUD (intrauterine device) in place    Morbid obesity, unspecified obesity type (HCC)    Poor concentration    Screening for HPV (human papillomavirus)    Screening mammogram, encounter for    Anxiety and depression    Morbid obesity (HCC)    Nail thickening    Snoring    Prediabetes    Keloid scar of skin    Hypersensitive carotid sinus syndrome    Vitamin B12 deficiency    Anemia    Abdominal distension    Moderately severe major depression (HCC)

## 2025-07-15 DIAGNOSIS — F32.2 MODERATELY SEVERE MAJOR DEPRESSION (HCC): ICD-10-CM

## 2025-07-15 DIAGNOSIS — E53.8 VITAMIN B12 DEFICIENCY: ICD-10-CM

## 2025-07-15 DIAGNOSIS — Z00.00 ANNUAL PHYSICAL EXAM: ICD-10-CM

## 2025-07-15 DIAGNOSIS — D64.9 ANEMIA, UNSPECIFIED TYPE: ICD-10-CM

## 2025-07-15 DIAGNOSIS — Z12.4 SCREENING FOR CERVICAL CANCER: ICD-10-CM

## 2025-07-15 DIAGNOSIS — Z97.5 IUD (INTRAUTERINE DEVICE) IN PLACE: ICD-10-CM

## 2025-07-15 DIAGNOSIS — R14.0 ABDOMINAL DISTENSION: ICD-10-CM

## 2025-07-15 DIAGNOSIS — R14.0 ABDOMINAL BLOATING: ICD-10-CM

## 2025-07-15 DIAGNOSIS — Z00.00 ROUTINE GENERAL MEDICAL EXAMINATION AT A HEALTH CARE FACILITY: ICD-10-CM

## 2025-07-15 DIAGNOSIS — E66.01 MORBID OBESITY (HCC): ICD-10-CM

## 2025-07-19 RX ORDER — ERGOCALCIFEROL 1.25 MG/1
50000 CAPSULE, LIQUID FILLED ORAL WEEKLY
Qty: 12 CAPSULE | Refills: 1 | OUTPATIENT
Start: 2025-07-19

## 2025-07-19 NOTE — TELEPHONE ENCOUNTER
Please Review. Protocol Failed; No Protocol     Requested Prescriptions   Pending Prescriptions Disp Refills    ERGOCALCIFEROL 1.25 MG (20867 UT) Oral Cap [Pharmacy Med Name: VITAMIN D2 50,000IU (ERGO) CAP RX] 12 capsule 1     Sig: TAKE 1 CAPSULE BY MOUTH 1 TIME A WEEK FOR 12 DOSES       There is no refill protocol information for this order      Refused Prescriptions Disp Refills    ERGOCALCIFEROL 1.25 MG (84236 UT) Oral Cap [Pharmacy Med Name: VITAMIN D2 50,000IU (ERGO) CAP RX] 12 capsule 1     Sig: TAKE 1 CAPSULE BY MOUTH 1 TIME A WEEK FOR 12 DOSES       There is no refill protocol information for this order

## 2025-07-21 RX ORDER — ERGOCALCIFEROL 1.25 MG/1
50000 CAPSULE, LIQUID FILLED ORAL WEEKLY
Qty: 12 CAPSULE | Refills: 1 | Status: SHIPPED | OUTPATIENT
Start: 2025-07-21

## 2025-07-30 ENCOUNTER — TELEPHONE (OUTPATIENT)
Facility: CLINIC | Age: 45
End: 2025-07-30

## 2025-07-30 DIAGNOSIS — D50.9 IRON DEFICIENCY ANEMIA, UNSPECIFIED IRON DEFICIENCY ANEMIA TYPE: Primary | ICD-10-CM

## 2025-07-30 DIAGNOSIS — E53.8 B12 DEFICIENCY: ICD-10-CM

## 2025-07-30 DIAGNOSIS — R10.30 LOWER ABDOMINAL PAIN: ICD-10-CM

## 2025-07-30 DIAGNOSIS — R14.0 BLOATING: ICD-10-CM

## 2025-08-18 DIAGNOSIS — E61.1 IRON DEFICIENCY: ICD-10-CM

## 2025-08-18 DIAGNOSIS — Z51.81 THERAPEUTIC DRUG MONITORING: ICD-10-CM

## 2025-08-18 DIAGNOSIS — E66.01 MORBID OBESITY (HCC): ICD-10-CM

## 2025-08-18 DIAGNOSIS — I10 ESSENTIAL HYPERTENSION: ICD-10-CM

## 2025-08-28 RX ORDER — TIRZEPATIDE 7.5 MG/.5ML
INJECTION, SOLUTION SUBCUTANEOUS
Qty: 6 ML | Refills: 0 | Status: SHIPPED | OUTPATIENT
Start: 2025-08-28

## (undated) DIAGNOSIS — Z01.419 VISIT FOR GYNECOLOGIC EXAMINATION: Primary | ICD-10-CM

## (undated) NOTE — LETTER
Date: 1/10/2022    Patient Name: Amisha CrabtreeAnnetta          To Whom it may concern: This letter has been written at the patient's request. The above patient was seen at the [facility] for treatment of a medical condition.     This patient

## (undated) NOTE — LETTER
Date & Time: 11/17/2019, 10:17 AM  Patient: Tonny Galicia  Encounter Provider(s):    Angel England MD       To Whom It May Concern:    Payam Aura was seen and treated in our department on 11/17/2019.  She should not return to work until

## (undated) NOTE — LETTER
Date & Time: 9/19/2018, 6:32 PM  Patient: Sherwin CrabtreeAnnetta  Encounter Provider(s):    Kofi Spann MD       To Whom It May Concern:    Payam Aura was seen and treated in our department on 9/19/2018.  She should not return to work until 9/

## (undated) NOTE — LETTER
3/13/2025          Payam Bro Day        [address] [address]         Dear Payam,    This letter is to inform you that our office has made several attempts to reach you by phone without success.  We were attempting to contact you by phone regarding scheduling your Colonoscopy and EGD.    Please contact our office at the number listed below as soon as you receive this letter to discuss this issue and to make the necessary changes in our system to your contact information.  Thank you for your cooperation.        Sincerely,    Vinay Ruvalcaba MD  [address]  [id number]  [phone]

## (undated) NOTE — LETTER
11/15/2024          Payam Bro Day    [address] [address]         Dear Payam,    Our records indicate that the tests ordered for you by LEXII Mohan  have not been done.      Helicobacter Pylori Breath Test, Adult Do NOT take any acid-reducing medications for TWO WEEKS before such as omeprazole (prilosec), esomeprazole (nexium), pantoprazole (protonix), lansoprazole (prevacid), or famotidine (pepcid).   XR ABDOMEN (1 VIEW) (CPT=74018) call central scheduling [phone].  If you have, in fact, already completed the tests or you do not wish to have the tests done, please contact our office at THE NUMBER LISTED BELOW.  Otherwise, please proceed with the testing.  Enclosed is a duplicate order for your convenience.        Sincerely,    LEXII Mohan  [facility], [address]  [facility] [address] [phone]

## (undated) NOTE — LETTER
Date & Time: 9/19/2018, 6:33 PM  Patient: Christiano More  Encounter Provider(s):    Zoltan Mcdonnell MD       To Whom It May Concern:    Payam Aura was seen and treated in our department on 9/19/2018.  She should not return to work until 9/

## (undated) NOTE — LETTER
Date & Time: 12/19/2018, 8:49 AM  Patient: Karon RogelShey  Encounter Provider(s):    Jocelin Coates MD       To Whom It May Concern:    Payam Aura was seen and treated in our department on 12/19/2018.  She should not return to work until flu